# Patient Record
Sex: MALE | Race: BLACK OR AFRICAN AMERICAN | NOT HISPANIC OR LATINO | Employment: UNEMPLOYED | URBAN - METROPOLITAN AREA
[De-identification: names, ages, dates, MRNs, and addresses within clinical notes are randomized per-mention and may not be internally consistent; named-entity substitution may affect disease eponyms.]

---

## 2018-04-09 ENCOUNTER — HOSPITAL ENCOUNTER (EMERGENCY)
Facility: HOSPITAL | Age: 28
Discharge: HOME | End: 2018-04-09
Attending: EMERGENCY MEDICINE
Payer: MEDICAID

## 2018-04-09 VITALS
TEMPERATURE: 98 F | WEIGHT: 200 LBS | BODY MASS INDEX: 26.51 KG/M2 | HEART RATE: 66 BPM | HEIGHT: 73 IN | OXYGEN SATURATION: 98 % | SYSTOLIC BLOOD PRESSURE: 115 MMHG | RESPIRATION RATE: 17 BRPM | DIASTOLIC BLOOD PRESSURE: 57 MMHG

## 2018-04-09 DIAGNOSIS — S91.019A LACERATION OF ANKLE: ICD-10-CM

## 2018-04-09 DIAGNOSIS — R56.9 SEIZURE: ICD-10-CM

## 2018-04-09 DIAGNOSIS — S96.821A: ICD-10-CM

## 2018-04-09 LAB
ALBUMIN SERPL BCP-MCNC: 4.2 G/DL
ALP SERPL-CCNC: 69 U/L
ALT SERPL W/O P-5'-P-CCNC: 12 U/L
ANION GAP SERPL CALC-SCNC: 19 MMOL/L
AST SERPL-CCNC: 24 U/L
BASOPHILS # BLD AUTO: 0.02 K/UL
BASOPHILS NFR BLD: 0.2 %
BILIRUB SERPL-MCNC: 0.5 MG/DL
BUN SERPL-MCNC: 12 MG/DL
CALCIUM SERPL-MCNC: 9.6 MG/DL
CHLORIDE SERPL-SCNC: 104 MMOL/L
CK SERPL-CCNC: 558 U/L
CO2 SERPL-SCNC: 16 MMOL/L
CREAT SERPL-MCNC: 1.3 MG/DL
DIFFERENTIAL METHOD: ABNORMAL
EOSINOPHIL # BLD AUTO: 0 K/UL
EOSINOPHIL NFR BLD: 0.3 %
ERYTHROCYTE [DISTWIDTH] IN BLOOD BY AUTOMATED COUNT: 13.7 %
EST. GFR  (AFRICAN AMERICAN): >60 ML/MIN/1.73 M^2
EST. GFR  (NON AFRICAN AMERICAN): >60 ML/MIN/1.73 M^2
GLUCOSE SERPL-MCNC: 99 MG/DL
HCT VFR BLD AUTO: 50.2 %
HGB BLD-MCNC: 16.8 G/DL
IMM GRANULOCYTES # BLD AUTO: 0.11 K/UL
IMM GRANULOCYTES NFR BLD AUTO: 1 %
INR PPP: 1
LIPASE SERPL-CCNC: 23 U/L
LYMPHOCYTES # BLD AUTO: 2.2 K/UL
LYMPHOCYTES NFR BLD: 19.9 %
MCH RBC QN AUTO: 31.1 PG
MCHC RBC AUTO-ENTMCNC: 33.5 G/DL
MCV RBC AUTO: 93 FL
MONOCYTES # BLD AUTO: 0.7 K/UL
MONOCYTES NFR BLD: 6.4 %
NEUTROPHILS # BLD AUTO: 7.8 K/UL
NEUTROPHILS NFR BLD: 72.2 %
NRBC BLD-RTO: 0 /100 WBC
PLATELET # BLD AUTO: 218 K/UL
PMV BLD AUTO: 10.9 FL
POTASSIUM SERPL-SCNC: 3.4 MMOL/L
PROT SERPL-MCNC: 8.1 G/DL
PROTHROMBIN TIME: 10.7 SEC
RBC # BLD AUTO: 5.41 M/UL
SODIUM SERPL-SCNC: 139 MMOL/L
WBC # BLD AUTO: 10.83 K/UL

## 2018-04-09 PROCEDURE — 96365 THER/PROPH/DIAG IV INF INIT: CPT | Mod: 59

## 2018-04-09 PROCEDURE — 25000003 PHARM REV CODE 250: Performed by: STUDENT IN AN ORGANIZED HEALTH CARE EDUCATION/TRAINING PROGRAM

## 2018-04-09 PROCEDURE — 83690 ASSAY OF LIPASE: CPT

## 2018-04-09 PROCEDURE — 90471 IMMUNIZATION ADMIN: CPT | Performed by: EMERGENCY MEDICINE

## 2018-04-09 PROCEDURE — 29515 APPLICATION SHORT LEG SPLINT: CPT | Mod: LT

## 2018-04-09 PROCEDURE — 90715 TDAP VACCINE 7 YRS/> IM: CPT | Performed by: EMERGENCY MEDICINE

## 2018-04-09 PROCEDURE — 80053 COMPREHEN METABOLIC PANEL: CPT

## 2018-04-09 PROCEDURE — 96361 HYDRATE IV INFUSION ADD-ON: CPT | Mod: 59

## 2018-04-09 PROCEDURE — 29515 APPLICATION SHORT LEG SPLINT: CPT | Mod: 59,RT,, | Performed by: EMERGENCY MEDICINE

## 2018-04-09 PROCEDURE — 63600175 PHARM REV CODE 636 W HCPCS: Performed by: EMERGENCY MEDICINE

## 2018-04-09 PROCEDURE — 25000003 PHARM REV CODE 250: Performed by: EMERGENCY MEDICINE

## 2018-04-09 PROCEDURE — 13122 CMPLX RPR S/A/L ADDL 5 CM/>: CPT | Mod: 51,59,RT, | Performed by: EMERGENCY MEDICINE

## 2018-04-09 PROCEDURE — 85025 COMPLETE CBC W/AUTO DIFF WBC: CPT

## 2018-04-09 PROCEDURE — 13121 CMPLX RPR S/A/L 2.6-7.5 CM: CPT | Mod: 51,59,RT, | Performed by: EMERGENCY MEDICINE

## 2018-04-09 PROCEDURE — 12034 INTMD RPR S/TR/EXT 7.6-12.5: CPT | Mod: 59

## 2018-04-09 PROCEDURE — 82550 ASSAY OF CK (CPK): CPT

## 2018-04-09 PROCEDURE — 96375 TX/PRO/DX INJ NEW DRUG ADDON: CPT | Mod: 59

## 2018-04-09 PROCEDURE — 99283 EMERGENCY DEPT VISIT LOW MDM: CPT | Mod: 25,,, | Performed by: EMERGENCY MEDICINE

## 2018-04-09 PROCEDURE — 85610 PROTHROMBIN TIME: CPT

## 2018-04-09 PROCEDURE — 99285 EMERGENCY DEPT VISIT HI MDM: CPT | Mod: 25

## 2018-04-09 RX ORDER — HYDROCODONE BITARTRATE AND ACETAMINOPHEN 5; 325 MG/1; MG/1
1 TABLET ORAL EVERY 4 HOURS PRN
Qty: 15 TABLET | Refills: 0 | Status: SHIPPED | OUTPATIENT
Start: 2018-04-09

## 2018-04-09 RX ORDER — CEFAZOLIN SODIUM 1 G/3ML
1 INJECTION, POWDER, FOR SOLUTION INTRAMUSCULAR; INTRAVENOUS
Status: DISCONTINUED | OUTPATIENT
Start: 2018-04-09 | End: 2018-04-09

## 2018-04-09 RX ORDER — AMOXICILLIN AND CLAVULANATE POTASSIUM 875; 125 MG/1; MG/1
1 TABLET, FILM COATED ORAL 2 TIMES DAILY
Qty: 14 TABLET | Refills: 0 | Status: SHIPPED | OUTPATIENT
Start: 2018-04-09

## 2018-04-09 RX ORDER — HYDROCODONE BITARTRATE AND ACETAMINOPHEN 5; 325 MG/1; MG/1
1 TABLET ORAL EVERY 4 HOURS PRN
Qty: 15 TABLET | Refills: 0 | Status: SHIPPED | OUTPATIENT
Start: 2018-04-09 | End: 2018-04-09

## 2018-04-09 RX ORDER — AMOXICILLIN AND CLAVULANATE POTASSIUM 875; 125 MG/1; MG/1
1 TABLET, FILM COATED ORAL 2 TIMES DAILY
Qty: 14 TABLET | Refills: 0 | Status: SHIPPED | OUTPATIENT
Start: 2018-04-09 | End: 2018-04-09

## 2018-04-09 RX ORDER — CEFAZOLIN SODIUM 1 G/3ML
2 INJECTION, POWDER, FOR SOLUTION INTRAMUSCULAR; INTRAVENOUS
Status: COMPLETED | OUTPATIENT
Start: 2018-04-09 | End: 2018-04-09

## 2018-04-09 RX ORDER — MORPHINE SULFATE 4 MG/ML
4 INJECTION, SOLUTION INTRAMUSCULAR; INTRAVENOUS
Status: COMPLETED | OUTPATIENT
Start: 2018-04-09 | End: 2018-04-09

## 2018-04-09 RX ORDER — LIDOCAINE HYDROCHLORIDE AND EPINEPHRINE 10; 10 MG/ML; UG/ML
20 INJECTION, SOLUTION INFILTRATION; PERINEURAL ONCE
Status: COMPLETED | OUTPATIENT
Start: 2018-04-09 | End: 2018-04-09

## 2018-04-09 RX ORDER — PROCHLORPERAZINE EDISYLATE 5 MG/ML
10 INJECTION INTRAMUSCULAR; INTRAVENOUS
Status: COMPLETED | OUTPATIENT
Start: 2018-04-09 | End: 2018-04-09

## 2018-04-09 RX ORDER — LIDOCAINE HYDROCHLORIDE 10 MG/ML
20 INJECTION INFILTRATION; PERINEURAL ONCE
Status: DISCONTINUED | OUTPATIENT
Start: 2018-04-09 | End: 2018-04-09

## 2018-04-09 RX ORDER — LEVETIRACETAM 10 MG/ML
1000 INJECTION INTRAVASCULAR
Status: COMPLETED | OUTPATIENT
Start: 2018-04-09 | End: 2018-04-09

## 2018-04-09 RX ORDER — CEFTRIAXONE 1 G/1
1 INJECTION, POWDER, FOR SOLUTION INTRAMUSCULAR; INTRAVENOUS
Status: DISCONTINUED | OUTPATIENT
Start: 2018-04-09 | End: 2018-04-09

## 2018-04-09 RX ADMIN — PROCHLORPERAZINE EDISYLATE 10 MG: 5 INJECTION INTRAMUSCULAR; INTRAVENOUS at 08:04

## 2018-04-09 RX ADMIN — MORPHINE SULFATE 4 MG: 4 INJECTION INTRAVENOUS at 09:04

## 2018-04-09 RX ADMIN — CLOSTRIDIUM TETANI TOXOID ANTIGEN (FORMALDEHYDE INACTIVATED), CORYNEBACTERIUM DIPHTHERIAE TOXOID ANTIGEN (FORMALDEHYDE INACTIVATED), BORDETELLA PERTUSSIS TOXOID ANTIGEN (GLUTARALDEHYDE INACTIVATED), BORDETELLA PERTUSSIS FILAMENTOUS HEMAGGLUTININ ANTIGEN (FORMALDEHYDE INACTIVATED), BORDETELLA PERTUSSIS PERTACTIN ANTIGEN, AND BORDETELLA PERTUSSIS FIMBRIAE 2/3 ANTIGEN 0.5 ML: 5; 2; 2.5; 5; 3; 5 INJECTION, SUSPENSION INTRAMUSCULAR at 09:04

## 2018-04-09 RX ADMIN — LIDOCAINE HYDROCHLORIDE,EPINEPHRINE BITARTRATE 20 ML: 10; .01 INJECTION, SOLUTION INFILTRATION; PERINEURAL at 11:04

## 2018-04-09 RX ADMIN — SODIUM CHLORIDE 1000 ML: 0.9 INJECTION, SOLUTION INTRAVENOUS at 09:04

## 2018-04-09 RX ADMIN — LEVETIRACETAM 1000 MG: 10 INJECTION INTRAVENOUS at 09:04

## 2018-04-09 RX ADMIN — CEFAZOLIN 2 G: 330 INJECTION, POWDER, FOR SOLUTION INTRAMUSCULAR; INTRAVENOUS at 09:04

## 2018-04-09 NOTE — DISCHARGE INSTRUCTIONS
If you would like to follow up with the [facility] for further care of your tendon laceration, please call the [facility] Scheduling Department at [phone] during business hours.  Please let the  know you need a fracture follow-up appointment with Orthopedics, and you will be scheduled in the Orthopedic Clinic.  Please bring your original Emergency Department discharge papers with  you to the clinic appointment.

## 2018-04-09 NOTE — ED NOTES
LOC: The patient is awake, alert and aware of environment with an appropriate affect, the patient is oriented x 3 and speaking appropriately.  APPEARANCE: Patient resting comfortably and in no acute distress, patient is clean and well groomed, patient's clothing is properly fastened.  SKIN: The skin is warm and dry, color consistent with ethnicity, patient has normal skin turgor and moist mucus membranes, skin intact, no breakdown or bruising noted.  Approx 6 cm irregular laceration to Left medial ankle, no active bleeding present  MUSCULOSKELETAL: Patient moving all extremities spontaneously, no obvious swelling or deformities noted.  RESPIRATORY: Airway is open and patent, respirations are spontaneous, patient has a normal effort and rate, no accessory muscle use.  CARDIAC: Patient has a normal rate and regular rhythm, no periphreal edema noted, capillary refill < 3 seconds.  ABDOMEN: Soft and non tender to palpation, no distention noted, normoactive bowel sounds present in all four quadrants, tender to mid abd region.  NEUROLOGIC:  facial expression is symmetrical, patient moving all extremities spontaneously, normal sensation in all extremities when touched with a finger.  Follows all commands appropriately.

## 2018-04-09 NOTE — ED PROVIDER NOTES
Encounter Date: 4/9/2018       History     Chief Complaint   Patient presents with    Seizures     HPI     27-year-old male brought in by EMS for evaluation of seizure, nausea/vomiting, as well as laceration.  Patient reports that after eating some [employer] this morning he began having severe epigastric abdominal pain and feeling very nauseous.  Patient then had a witnessed seizure.  Patient has a known history of seizure disorder and is supposed to take Keppra as well as Dilantin but has been noncompliant with medications for the last 1 month.  Per patient's family who witnessed the seizure patient had full body shaking consistent with previous seizures.  However, patient did strike a mirror during his seizure resulting in an ankle laceration.  On EMS arrival patient was postictal but by the time he arrived to the ED patient had cleared and was mentating appropriately.  However he was having severe vomiting that was nonbilious and nonbloody.    Review of patient's allergies indicates:  No Known Allergies  Past Medical History:   Diagnosis Date    Seizure      No past surgical history on file.  No family history on file.  Social History   Substance Use Topics    Smoking status: Not on file    Smokeless tobacco: Not on file    Alcohol use Not on file     Review of Systems   Constitutional: Negative for chills and fever.   HENT: Negative for sore throat and voice change.    Eyes: Negative for pain and visual disturbance.   Respiratory: Negative for cough and shortness of breath.    Cardiovascular: Negative for chest pain and palpitations.   Gastrointestinal: Positive for abdominal pain, nausea and vomiting. Negative for abdominal distention, constipation and diarrhea.   Genitourinary: Negative for dysuria and flank pain.   Musculoskeletal: Negative for back pain and neck pain.   Skin: Positive for wound. Negative for rash.   Neurological: Positive for seizures and headaches. Negative for weakness and  light-headedness.       Physical Exam     Initial Vitals [04/09/18 0855]   BP Pulse Resp Temp SpO2   (!) 122/91 (!) 118 20 97.8 °F (36.6 °C) 99 %      MAP       101.33         Physical Exam    Nursing note and vitals reviewed.  Constitutional: He appears well-developed and well-nourished. He appears distressed.   HENT:   Head: Normocephalic and atraumatic.   Eyes: Conjunctivae are normal. Pupils are equal, round, and reactive to light. No scleral icterus.   Neck: Normal range of motion. Neck supple.   Cardiovascular: Regular rhythm, normal heart sounds and intact distal pulses. Tachycardia present.    No murmur heard.  Pulmonary/Chest: Breath sounds normal. He has no wheezes.   Abdominal: Soft. Bowel sounds are normal. He exhibits no distension. There is tenderness (epigastric). There is no rebound and no guarding.   Musculoskeletal: He exhibits no edema.        Feet:    Approximately 10 cm laceration over the left medial ankle.  Laceration begins just dorsal to the medial malleoli and extends posteriorly across the medial malleoli to the back at the foot.  Examination of the wound revealed findings concerning for likely tendon laceration.    Patient is able to dorsi and plantarflex the foot with no difficulty but is unable to dustin or invert the left foot.  Wound is hemostatic and there is a good dorsalis pedis pulse.   Neurological: He is oriented to person, place, and time. No cranial nerve deficit.   Skin: Skin is warm and dry.             ED Course   Lac Repair  Date/Time: 4/9/2018 2:32 PM  Performed by: MARYBEL DOWLING  Authorized by: CHAPITO ESPAÑA   Body area: lower extremity  Location details: left ankle  Laceration length: 10 cm  Foreign bodies: no foreign bodies  Tendon involvement: complex  Nerve involvement: none  Vascular damage: no  Anesthesia: local infiltration    Anesthesia:  Local Anesthetic: lidocaine 2% with epinephrine  Anesthetic total: 8 mL  Patient sedated: no  Irrigation solution:  saline  Irrigation method: syringe  Amount of cleaning: extensive  Debridement: none  Degree of undermining: none  Skin closure: 3-0 Prolene  Subcutaneous closure: 5-0 Vicryl  Number of sutures: 15 (3 subQ)  Technique: simple  Approximation: close  Approximation difficulty: complex  Dressing: non-stick sterile dressing and splint for protection  Patient tolerance: Patient tolerated the procedure well with no immediate complications    Splint Application  Date/Time: 4/9/2018 2:34 PM  Performed by: MARYBEL DOWLING  Authorized by: CHAPITO ESPAÑA   Location details: left ankle  Splint type: short leg  Supplies used: cotton padding,  plaster and elastic bandage  Post-procedure: The splinted body part was neurovascularly unchanged following the procedure.  Patient tolerance: Patient tolerated the procedure well with no immediate complications        Labs Reviewed   CBC W/ AUTO DIFFERENTIAL - Abnormal; Notable for the following:        Result Value    MCH 31.1 (*)     Immature Granulocytes 1.0 (*)     Gran # (ANC) 7.8 (*)     Immature Grans (Abs) 0.11 (*)     All other components within normal limits   COMPREHENSIVE METABOLIC PANEL - Abnormal; Notable for the following:     Potassium 3.4 (*)     CO2 16 (*)     Anion Gap 19 (*)     All other components within normal limits   CK - Abnormal; Notable for the following:      (*)     All other components within normal limits   LIPASE   PROTIME-INR     Imaging Results          X-Ray Ankle Complete Left (Final result)  Result time 04/09/18 10:17:13    Final result by Jorje Morris MD (04/09/18 10:17:13)                 Impression:      See above      Electronically signed by: Jorje Morris MD  Date:    04/09/2018  Time:    10:17             Narrative:    EXAMINATION:  XR ANKLE COMPLETE 3 VIEW LEFT    CLINICAL HISTORY:  Laceration without foreign body, unspecified ankle, initial encounter    TECHNIQUE:  AP, lateral and oblique views of the left ankle were  performed.    COMPARISON:  None    FINDINGS:  Soft tissue injury a and/or laceration posteriorly in the distal leg and ankle.  No fracture or dislocation.  No bone destruction identified                                    HOII MDM    DDx includes but is not limited to: Fracture, dislocation, foreign body, tendon injury, pancreatitis, gastroenteritis, rhabdomyolysis, electrolyte abnormality, glucose abnormality  A/P:  Pt is a 27-year-old male with seizures, nausea/vomiting, and laceration.  Patient's signs and symptoms are most consistent with a gastroenteritis secondary to foodborne illness .patient's lipase was normal and the vomiting was controlled with Compazine.  Patient did have a mild anion gap as well as a mildly elevated CPK consistent with the seizure that was reported.  There were no other remarkable electrolyte or glucose abnormalities.  Patient's seizure was likely secondary to his medication noncompliance.  He was loaded with Keppra in the ED today.  X-ray did not reveal any fracture or dislocation.  Orthopedics was consulted given the possible involvement of tendon injury.  Patient's final disposition is pending orthopedic recommendations as well as wound closure.     Case Discussed with Dr. Pat MEJIA  04/09/2018 11:28 AM      UPDATE  Pt has remained seizure-free as well as had no episodes of emesis.  Patient has received IV fluids as well as antibiotics.  Laceration was repaired and posterior slab splint placed per Dr. Chilel recommendations.  Dr. Chilel with orthopedics evaluated the patient and feels that he likely has a posterior tibialis tendon laceration that may require surgical repair.  He recommends skin closure and posterior slab splint at this time and patient will need to follow-up with orthopedics will discharge patient with Augmentin and pain medication as well as follow-up information for [facility]    Sujata MEJIA  04/09/2018 2:31 PM           Medical  Decision Making:   Differential Diagnosis:   Seizure disorder, electrolyte derangement, foot laceration, tendon laceration  Clinical Tests:   Lab Tests: Ordered and Reviewed  Radiological Study: Ordered and Reviewed  Other:   I have discussed this case with another health care provider.              Attending Attestation:   Physician Attestation Statement for Resident:  As the supervising MD   Physician Attestation Statement: I have personally seen and examined this patient.   I agree with the above history. -: 27-year-old man presents for evaluation of nausea, vomiting, seizure activity, as well as a laceration to his left ankle sustained earlier this morning.   As the supervising MD I agree with the above PE.    As the supervising MD I agree with the above treatment, course, plan, and disposition.  I was personally present during the critical portions of the procedure(s) performed by the resident and was immediately available in the ED to provide services and assistance as needed during the entire procedure.  I have reviewed and agree with the residents interpretation of the following: lab data, x-rays and CT scans.                       Clinical Impression:   The primary encounter diagnosis was Tendon laceration. Diagnoses of Laceration of ankle and Seizure were also pertinent to this visit.    Disposition:   Disposition: Discharged  Condition: Stable                        Sujata Starr MD  Resident  04/09/18 3017       Craig Stewart MD  04/12/18 [id number]

## 2018-04-10 DIAGNOSIS — S86.112A RUPTURE OF LEFT POSTERIOR TIBIALIS TENDON, INITIAL ENCOUNTER: Primary | ICD-10-CM

## 2018-04-10 PROBLEM — S91.012A LACERATION OF LEFT ANKLE: Status: ACTIVE | Noted: 2018-04-10

## 2018-04-10 RX ORDER — SODIUM CHLORIDE 9 MG/ML
INJECTION, SOLUTION INTRAVENOUS CONTINUOUS
Status: CANCELLED | OUTPATIENT
Start: 2018-04-10

## 2018-04-10 RX ORDER — MUPIROCIN 20 MG/G
OINTMENT TOPICAL
Status: CANCELLED | OUTPATIENT
Start: 2018-04-10

## 2018-04-10 NOTE — SUBJECTIVE & OBJECTIVE
"Past Medical History:   Diagnosis Date    Seizure        PSH: none    Review of patient's allergies indicates:  No Known Allergies    No current facility-administered medications for this encounter.      Current Outpatient Prescriptions   Medication Sig    LEVETIRACETAM (KEPPRA ORAL) Take by mouth.    PHENYTOIN SODIUM EXTENDED (DILANTIN ORAL) Take by mouth.    amoxicillin-clavulanate 875-125mg (AUGMENTIN) 875-125 mg per tablet Take 1 tablet by mouth 2 (two) times daily.    hydrocodone-acetaminophen 5-325mg (NORCO) 5-325 mg per tablet Take 1 tablet by mouth every 4 (four) hours as needed for Pain.     Family History     None        Social History Main Topics    Smoking status: Not on file    Smokeless tobacco: Not on file    Alcohol use Not on file    Drug use: Unknown    Sexual activity: Not on file     ROS   Constitutional: negative for fevers  Eyes: no visual changes  ENT: negative for hearing loss  Respiratory: negative for dyspnea  Cardiovascular: negative for chest pain  Gastrointestinal: negative for abdominal pain  Genitourinary: negative for dysuria  Neurological: negative for headaches  Behavioral/Psych: negative for hallucinations  Endocrine: negative for temperature intolerance    Objective:     Vital Signs (Most Recent):  Temp: 97.8 °F (36.6 °C) (04/09/18 0855)  Pulse: 66 (04/09/18 1403)  Resp: 17 (04/09/18 1403)  BP: (!) 115/57 (04/09/18 1402)  SpO2: 98 % (04/09/18 1403) Vital Signs (24h Range):  Pulse:  [65-66] 66  Resp:  [17] 17  SpO2:  [97 %-98 %] 98 %  BP: (115)/(57) 115/57     Weight: 90.7 kg (200 lb)  Height: 6' 1" (185.4 cm)  Body mass index is 26.39 kg/m².        Ortho/SPM Exam  Vitals: Afebrile.  Vital signs stable.  General: No acute distress.  HEENT: Normocephalic. Atraumatic. Sclera anicteric. No tracheal deviation.  Cardio: Regular rate and rhythm.  Chest: No increased work of breathing.  Abdominal: Nondistended.  Extremities: No cyanosis.  No clubbing.  No edema.  Palpable " pulses.  Skin: No generalized rash.  Neuro: Awake. Alert. Oriented to person, place, time, and situation.  Psych: Normal appearance. Cooperative.  Appropriate mood.  Appropriate affect.      MSK:  LLE:   ~10cm transverse laceration 3cm proximal to tip of medial malleolus involving muscle/bone extending from achilles tendon anteriorly  No deformity  No obvious ecchymoses  Compartments soft and compressible  No pain with ankle ROM  Achilles, FHL, FDL intact  Tendon retraction seen posterior to tibia during active ankle plantarflexion  SILT T/SP/DP/Stovall/Sa  Motor intact T/SP/DP  Brisk cap refill  Warm well perfused extremities  DP/PT palpable and equal to contralateral    Significant Labs:   BMP:   Recent Labs  Lab 04/09/18  0901   GLU 99      K 3.4*      CO2 16*   BUN 12   CREATININE 1.3   CALCIUM 9.6     CBC:   Recent Labs  Lab 04/09/18  0901   WBC 10.83   HGB 16.8   HCT 50.2        All pertinent labs within the past 24 hours have been reviewed.    Significant Imaging: I have reviewed all pertinent imaging results/findings.     XR L ankle negative for fracture or dislocation.

## 2018-04-10 NOTE — HPI
27M with h/o epilepsy on Keppra and Dilantin presents to ED with L ankle laceration after likely seizure.    Seizure was witnessed.  Witness reports episode similar to prior seizures.  L ankle struck and broke mirror leading to laceration.  Preceeded by nausea, abdominal pain.  Patient has been noncompliant with medications past month.  Postictal on EMS arrival.  Mentating appropriately in ED with n/v.  Denies injuries to head/neck/other extremies, numbness, tingling.  Ancef given.  Tetanus updated.

## 2018-04-10 NOTE — ASSESSMENT & PLAN NOTE
27 y.o. male with L ankle laceration and likely tibialis posterior laceration    Laceration closed and splinted by ED staff  Pain control  Abx: Ancef given in ED; tetanus updated    Recommend exploration with primary repair vs transfer.  Surgery scheduled 4/18/18 with clinic f/u prior.

## 2018-04-10 NOTE — CONSULTS
[facility]  Orthopedics  Consult Note    Patient Name: Michael uS  MRN: [id number]  Admission Date: 4/9/2018  Hospital Length of Stay: 0 days  Primary Care Provider: No primary care provider on file.    Patient information was obtained from patient, past medical records and ER records.     Consults  Subjective:       Chief Complaint:   Chief Complaint   Patient presents with    Seizures        HPI: 27M with h/o epilepsy on Keppra and Dilantin presents to ED with L ankle laceration after likely seizure.    Seizure was witnessed.  Witness reports episode similar to prior seizures.  L ankle struck and broke mirror leading to laceration.  Preceeded by nausea, abdominal pain.  Patient has been noncompliant with medications past month.  Postictal on EMS arrival.  Mentating appropriately in ED with n/v.  Denies injuries to head/neck/other extremies, numbness, tingling.  Ancef given.  Tetanus updated.    Past Medical History:   Diagnosis Date    Seizure        PSH: none    Review of patient's allergies indicates:  No Known Allergies    No current facility-administered medications for this encounter.      Current Outpatient Prescriptions   Medication Sig    LEVETIRACETAM (KEPPRA ORAL) Take by mouth.    PHENYTOIN SODIUM EXTENDED (DILANTIN ORAL) Take by mouth.    amoxicillin-clavulanate 875-125mg (AUGMENTIN) 875-125 mg per tablet Take 1 tablet by mouth 2 (two) times daily.    hydrocodone-acetaminophen 5-325mg (NORCO) 5-325 mg per tablet Take 1 tablet by mouth every 4 (four) hours as needed for Pain.     Family History     None        Social History Main Topics    Smoking status: Not on file    Smokeless tobacco: Not on file    Alcohol use Not on file    Drug use: Unknown    Sexual activity: Not on file     ROS   Constitutional: negative for fevers  Eyes: no visual changes  ENT: negative for hearing loss  Respiratory: negative for dyspnea  Cardiovascular: negative for chest  "pain  Gastrointestinal: negative for abdominal pain  Genitourinary: negative for dysuria  Neurological: negative for headaches  Behavioral/Psych: negative for hallucinations  Endocrine: negative for temperature intolerance    Objective:     Vital Signs (Most Recent):  Temp: 97.8 °F (36.6 °C) (04/09/18 0855)  Pulse: 66 (04/09/18 1403)  Resp: 17 (04/09/18 1403)  BP: (!) 115/57 (04/09/18 1402)  SpO2: 98 % (04/09/18 1403) Vital Signs (24h Range):  Pulse:  [65-66] 66  Resp:  [17] 17  SpO2:  [97 %-98 %] 98 %  BP: (115)/(57) 115/57     Weight: 90.7 kg (200 lb)  Height: 6' 1" (185.4 cm)  Body mass index is 26.39 kg/m².        Ortho/SPM Exam  Vitals: Afebrile.  Vital signs stable.  General: No acute distress.  HEENT: Normocephalic. Atraumatic. Sclera anicteric. No tracheal deviation.  Cardio: Regular rate and rhythm.  Chest: No increased work of breathing.  Abdominal: Nondistended.  Extremities: No cyanosis.  No clubbing.  No edema.  Palpable pulses.  Skin: No generalized rash.  Neuro: Awake. Alert. Oriented to person, place, time, and situation.  Psych: Normal appearance. Cooperative.  Appropriate mood.  Appropriate affect.      MSK:  LLE:   ~10cm transverse laceration 3cm proximal to tip of medial malleolus involving muscle/bone extending from achilles tendon anteriorly  No deformity  No obvious ecchymoses  Compartments soft and compressible  No pain with ankle ROM  Achilles, FHL, FDL intact  Tendon retraction seen posterior to tibia during active ankle plantarflexion  SILT T/SP/DP/Stovall/Sa  Motor intact T/SP/DP  Brisk cap refill  Warm well perfused extremities  DP/PT palpable and equal to contralateral    Significant Labs:   BMP:   Recent Labs  Lab 04/09/18  0901   GLU 99      K 3.4*      CO2 16*   BUN 12   CREATININE 1.3   CALCIUM 9.6     CBC:   Recent Labs  Lab 04/09/18 0901   WBC 10.83   HGB 16.8   HCT 50.2        All pertinent labs within the past 24 hours have been reviewed.    Significant Imaging: I " have reviewed all pertinent imaging results/findings.     XR L ankle negative for fracture or dislocation.    Assessment/Plan:     Laceration of left ankle    27 y.o. male with L ankle laceration and likely tibialis posterior laceration    Laceration closed and splinted by ED staff  Pain control  Abx: Ancef given in ED; tetanus updated    Recommend exploration with primary repair vs transfer.  Surgery scheduled 4/18/18 with clinic f/u prior.            Thank you for your consult.     Mitch Chilel MD  Orthopedics  [facility]

## 2018-04-11 ENCOUNTER — PES CALL (OUTPATIENT)
Dept: ADMINISTRATIVE | Facility: CLINIC | Age: 28
End: 2018-04-11

## 2018-04-13 ENCOUNTER — TELEPHONE (OUTPATIENT)
Dept: ORTHOPEDICS | Facility: CLINIC | Age: 28
End: 2018-04-13

## 2018-04-13 NOTE — TELEPHONE ENCOUNTER
Spoke with pt notified him he had an appt this morning pt states his mother in law will bring him between [phone]

## 2018-04-14 ENCOUNTER — ANESTHESIA EVENT (OUTPATIENT)
Dept: SURGERY | Facility: HOSPITAL | Age: 28
End: 2018-04-14

## 2018-04-14 ENCOUNTER — HOSPITAL ENCOUNTER (EMERGENCY)
Facility: HOSPITAL | Age: 28
Discharge: LEFT BEFORE DISCHARGE | End: 2018-04-14
Attending: EMERGENCY MEDICINE
Payer: MEDICAID

## 2018-04-14 VITALS
RESPIRATION RATE: 18 BRPM | SYSTOLIC BLOOD PRESSURE: 138 MMHG | TEMPERATURE: 98 F | DIASTOLIC BLOOD PRESSURE: 79 MMHG | OXYGEN SATURATION: 100 % | BODY MASS INDEX: 28.88 KG/M2 | WEIGHT: 225 LBS | HEART RATE: 95 BPM | HEIGHT: 74 IN

## 2018-04-14 DIAGNOSIS — S91.012A: ICD-10-CM

## 2018-04-14 DIAGNOSIS — S96.922A: ICD-10-CM

## 2018-04-14 LAB
ALBUMIN SERPL BCP-MCNC: 4.1 G/DL
ALP SERPL-CCNC: 68 U/L
ALT SERPL W/O P-5'-P-CCNC: 16 U/L
ANION GAP SERPL CALC-SCNC: 8 MMOL/L
AST SERPL-CCNC: 26 U/L
BASOPHILS # BLD AUTO: 0.03 K/UL
BASOPHILS NFR BLD: 0.4 %
BILIRUB SERPL-MCNC: 0.6 MG/DL
BUN SERPL-MCNC: 8 MG/DL
CALCIUM SERPL-MCNC: 10.2 MG/DL
CHLORIDE SERPL-SCNC: 104 MMOL/L
CO2 SERPL-SCNC: 29 MMOL/L
CREAT SERPL-MCNC: 1 MG/DL
DIFFERENTIAL METHOD: ABNORMAL
EOSINOPHIL # BLD AUTO: 0.2 K/UL
EOSINOPHIL NFR BLD: 2.3 %
ERYTHROCYTE [DISTWIDTH] IN BLOOD BY AUTOMATED COUNT: 13.4 %
EST. GFR  (AFRICAN AMERICAN): >60 ML/MIN/1.73 M^2
EST. GFR  (NON AFRICAN AMERICAN): >60 ML/MIN/1.73 M^2
GLUCOSE SERPL-MCNC: 95 MG/DL
HCT VFR BLD AUTO: 44.7 %
HGB BLD-MCNC: 15.2 G/DL
IMM GRANULOCYTES # BLD AUTO: 0.02 K/UL
IMM GRANULOCYTES NFR BLD AUTO: 0.3 %
LYMPHOCYTES # BLD AUTO: 2 K/UL
LYMPHOCYTES NFR BLD: 29 %
MCH RBC QN AUTO: 31.2 PG
MCHC RBC AUTO-ENTMCNC: 34 G/DL
MCV RBC AUTO: 92 FL
MONOCYTES # BLD AUTO: 0.6 K/UL
MONOCYTES NFR BLD: 9 %
NEUTROPHILS # BLD AUTO: 4.1 K/UL
NEUTROPHILS NFR BLD: 59 %
NRBC BLD-RTO: 0 /100 WBC
PLATELET # BLD AUTO: 216 K/UL
PMV BLD AUTO: 11.1 FL
POTASSIUM SERPL-SCNC: 3.8 MMOL/L
PROT SERPL-MCNC: 8.2 G/DL
RBC # BLD AUTO: 4.87 M/UL
SODIUM SERPL-SCNC: 141 MMOL/L
WBC # BLD AUTO: 6.99 K/UL

## 2018-04-14 PROCEDURE — 99283 EMERGENCY DEPT VISIT LOW MDM: CPT

## 2018-04-14 PROCEDURE — 80053 COMPREHEN METABOLIC PANEL: CPT

## 2018-04-14 PROCEDURE — 99283 EMERGENCY DEPT VISIT LOW MDM: CPT | Mod: ,,, | Performed by: PHYSICIAN ASSISTANT

## 2018-04-14 PROCEDURE — 85025 COMPLETE CBC W/AUTO DIFF WBC: CPT

## 2018-04-14 NOTE — ED NOTES
Pt reports ankle infected cut it last Monday and suppose to have surgery next Monday. Sutures noted to site+ redness and sweling noted to site, pt reports cloudy drainage Wednesday and drainage stopped yesterday. Finished atbx early, took too often. Also c/o tooth broke last night.     LOC: The patient is awake, alert and aware of environment with an appropriate affect, the patient is oriented x 3 and speaking appropriately.  APPEARANCE: Patient resting comfortably and in no acute distress, patient is clean and well groomed, patient's clothing is properly fastened.  SKIN: The skin is warm and dry, intact, patient has normal skin turgor and moist mucus membranes.   RESPIRATORY: Airway is open and patent, respirations are spontaneous, patient has a normal effort and rate.  CARDIAC: Patient has a normal rate and rhythm, no periphreal edema noted, capillary refill < 3 seconds. Bilateral radial pulses +2  ABDOMEN: Soft and non tender to palpation, no distention noted. Bowel sounds present  NEUROLOGIC: PERRL, facial expression is symmetrical, patient moving all extremities spontaneously, normal sensation in all extremities when touched with a finger. Follows all commands appropriately  MUSCULOSKELETAL: No obvious deformities. Full ROM in all 4 extremities. Sutures in lateral left ankle noted with redness and swelling

## 2018-04-14 NOTE — ED NOTES
Pt left [facility] w/o telling nurse or tech where he was going. Looked for pt in waiting room and outside, unable to find pt. Called pt on number listed on chart. Pt stated he was in the car eating with his mother. Pt instructed to return to [facility] to have his foot dressed. SHANIA Voss notified.

## 2018-04-14 NOTE — ED NOTES
SHANIA Voss attempted to call pt again with no answer. Will elope pt per her orders. Pt left with IV in place.

## 2018-04-14 NOTE — ED PROVIDER NOTES
"Encounter Date: 4/14/2018       History     Chief Complaint   Patient presents with    Wound Infection     L ankle sutures, red and swollen, ran out of antibiotics and pain pills    Dental Pain     tooth broke  last night'     26 yo M with hx of seizures presents to the ED for a wound check. Pt seen in this ED 5 days ago after he had a seizure and sustained a laceration of the L ankle with concerns for tendon laceration.  Pt presented today because he ran out of pain medication and his antibiotics. Pt was given a 7-day course of Augmentin, which he completed today (day 6).  Pt reports cloudy drainage from the wound 2 days ago as well as chills and weakness this morning.           Review of patient's allergies indicates:  No Known Allergies  Past Medical History:   Diagnosis Date    Seizure      History reviewed. No pertinent surgical history.  History reviewed. No pertinent family history.  Social History   Substance Use Topics    Smoking status: Never Smoker    Smokeless tobacco: Never Used    Alcohol use Yes      Comment: occasional      Review of Systems   Constitutional: Positive for chills. Negative for fever.   HENT: Negative for sore throat.    Respiratory: Negative for shortness of breath.    Cardiovascular: Negative for chest pain.   Gastrointestinal: Negative for nausea.   Genitourinary: Negative for dysuria.   Musculoskeletal: Negative for back pain.   Skin: Positive for wound. Negative for rash.   Neurological: Positive for weakness.   Hematological: Does not bruise/bleed easily.       Physical Exam     Initial Vitals [04/14/18 0936]   BP Pulse Resp Temp SpO2   136/80 81 16 98.3 °F (36.8 °C) 98 %      MAP       98.67         Physical Exam    Nursing note and vitals reviewed.  Constitutional: He appears well-developed and well-nourished.  Non-toxic appearance. He does not appear ill. No distress.   Pt is diaphoretic - states he "sweats all the time". Otherwise nontoxic appearing.    HENT:   Head: " Normocephalic and atraumatic.   Neck: Normal range of motion. Neck supple.   Cardiovascular: Normal rate and regular rhythm. Exam reveals no gallop, no distant heart sounds and no friction rub.    No murmur heard.  Pulmonary/Chest: Effort normal and breath sounds normal. No accessory muscle usage. No tachypnea. No respiratory distress. He has no decreased breath sounds. He has no wheezes. He has no rhonchi. He has no rales.   Abdominal: He exhibits no distension.   Musculoskeletal: Normal range of motion.   Swelling erythema and warmth with tenderness to the medial ankle. Large laceration with multiple sutures in place. DP pulse intact.    Neurological: He is alert.   Skin: Skin is warm and dry. No rash noted. No pallor.   Psychiatric: He has a normal mood and affect. His behavior is normal.         ED Course   Procedures  Labs Reviewed   CBC W/ AUTO DIFFERENTIAL - Abnormal; Notable for the following:        Result Value    MCH 31.2 (*)     All other components within normal limits   COMPREHENSIVE METABOLIC PANEL             Medical Decision Making:   History:   Old Medical Records: I decided to obtain old medical records.  Differential Diagnosis:   My differential diagnosis includes but is not limited to:  Cellulitis, abscess, lymphangitis   Clinical Tests:   Lab Tests: Ordered and Reviewed       APC / Resident Notes:   28 yo M presents with to the ED for reevaluation of wound that was sutured at this ED 5 days ago. There is erythema and warmth surrounding the laceration. No active purulent drainage. DP pulse intact.    CBC without leukocytosis. 4 sutures removed to allow for wound drainage. The wound was not dehisced.     Pt eloped prior to redressing and resplinting of wound.  Patient was contacted by phone initially and advised to return to the ER.  Multiple attempts to contact patient were unsuccessful when he did not return to the ED.     I have reviewed the patient's records and discussed this case with my  supervising physician.                   Clinical Impression:   The encounter diagnosis was Tendon laceration.    Disposition:   Disposition: Eloped  Condition: Stable                        Shanika You PA-C  04/14/18 7648

## 2018-04-17 ENCOUNTER — TELEPHONE (OUTPATIENT)
Dept: ORTHOPEDICS | Facility: CLINIC | Age: 28
End: 2018-04-17

## 2018-04-17 NOTE — TELEPHONE ENCOUNTER
Spoke with pt.   He was unable to come to clinic for his pre op appointment.  Pt rescheduled several times.  No showed for all appointments.  Pt will have his mom call me to schedule an appointment.

## 2018-04-18 ENCOUNTER — ANESTHESIA (OUTPATIENT)
Dept: SURGERY | Facility: HOSPITAL | Age: 28
End: 2018-04-18

## 2018-07-10 ENCOUNTER — HOSPITAL ENCOUNTER (EMERGENCY)
Facility: OTHER | Age: 28
Discharge: HOME | End: 2018-07-10
Attending: EMERGENCY MEDICINE
Payer: MEDICAID

## 2018-07-10 VITALS
HEART RATE: 62 BPM | OXYGEN SATURATION: 100 % | HEIGHT: 74 IN | RESPIRATION RATE: 17 BRPM | TEMPERATURE: 98 F | BODY MASS INDEX: 28.88 KG/M2 | SYSTOLIC BLOOD PRESSURE: 157 MMHG | DIASTOLIC BLOOD PRESSURE: 86 MMHG | WEIGHT: 225 LBS

## 2018-07-10 DIAGNOSIS — R56.9 SEIZURE: Primary | ICD-10-CM

## 2018-07-10 DIAGNOSIS — M25.579 ANKLE PAIN: ICD-10-CM

## 2018-07-10 LAB
ALBUMIN SERPL BCP-MCNC: 4.3 G/DL
ALP SERPL-CCNC: 55 U/L
ALT SERPL W/O P-5'-P-CCNC: 11 U/L
ANION GAP SERPL CALC-SCNC: 8 MMOL/L
AST SERPL-CCNC: 19 U/L
BASOPHILS # BLD AUTO: 0.01 K/UL
BASOPHILS NFR BLD: 0.2 %
BILIRUB SERPL-MCNC: 0.6 MG/DL
BUN SERPL-MCNC: 14 MG/DL
CALCIUM SERPL-MCNC: 9.7 MG/DL
CHLORIDE SERPL-SCNC: 107 MMOL/L
CO2 SERPL-SCNC: 26 MMOL/L
CREAT SERPL-MCNC: 1.2 MG/DL
DIFFERENTIAL METHOD: ABNORMAL
EOSINOPHIL # BLD AUTO: 0.1 K/UL
EOSINOPHIL NFR BLD: 1.9 %
ERYTHROCYTE [DISTWIDTH] IN BLOOD BY AUTOMATED COUNT: 13.8 %
EST. GFR  (AFRICAN AMERICAN): >60 ML/MIN/1.73 M^2
EST. GFR  (NON AFRICAN AMERICAN): >60 ML/MIN/1.73 M^2
GLUCOSE SERPL-MCNC: 84 MG/DL
HCT VFR BLD AUTO: 45.4 %
HGB BLD-MCNC: 15.9 G/DL
LYMPHOCYTES # BLD AUTO: 1.6 K/UL
LYMPHOCYTES NFR BLD: 30.3 %
MCH RBC QN AUTO: 31.8 PG
MCHC RBC AUTO-ENTMCNC: 35 G/DL
MCV RBC AUTO: 91 FL
MONOCYTES # BLD AUTO: 0.3 K/UL
MONOCYTES NFR BLD: 6.2 %
NEUTROPHILS # BLD AUTO: 3.3 K/UL
NEUTROPHILS NFR BLD: 61 %
PHENYTOIN SERPL-MCNC: <0.1 UG/ML
PLATELET # BLD AUTO: 195 K/UL
PMV BLD AUTO: 10.8 FL
POTASSIUM SERPL-SCNC: 4.8 MMOL/L
PROT SERPL-MCNC: 7.6 G/DL
RBC # BLD AUTO: 5 M/UL
SODIUM SERPL-SCNC: 141 MMOL/L
WBC # BLD AUTO: 5.35 K/UL

## 2018-07-10 PROCEDURE — 96361 HYDRATE IV INFUSION ADD-ON: CPT

## 2018-07-10 PROCEDURE — 85025 COMPLETE CBC W/AUTO DIFF WBC: CPT

## 2018-07-10 PROCEDURE — 63600175 PHARM REV CODE 636 W HCPCS: Mod: JG | Performed by: EMERGENCY MEDICINE

## 2018-07-10 PROCEDURE — 80185 ASSAY OF PHENYTOIN TOTAL: CPT

## 2018-07-10 PROCEDURE — 99284 EMERGENCY DEPT VISIT MOD MDM: CPT | Mod: 25

## 2018-07-10 PROCEDURE — 93005 ELECTROCARDIOGRAM TRACING: CPT

## 2018-07-10 PROCEDURE — 80053 COMPREHEN METABOLIC PANEL: CPT

## 2018-07-10 PROCEDURE — 93010 ELECTROCARDIOGRAM REPORT: CPT | Mod: ,,, | Performed by: INTERNAL MEDICINE

## 2018-07-10 PROCEDURE — 96365 THER/PROPH/DIAG IV INF INIT: CPT

## 2018-07-10 PROCEDURE — 25000003 PHARM REV CODE 250: Performed by: EMERGENCY MEDICINE

## 2018-07-10 RX ORDER — PHENYTOIN SODIUM 100 MG/1
100 CAPSULE, EXTENDED RELEASE ORAL 3 TIMES DAILY
Qty: 90 CAPSULE | Refills: 0 | Status: SHIPPED | OUTPATIENT
Start: 2018-07-10 | End: 2018-08-09

## 2018-07-10 RX ORDER — IBUPROFEN 400 MG/1
800 TABLET ORAL
Status: COMPLETED | OUTPATIENT
Start: 2018-07-10 | End: 2018-07-10

## 2018-07-10 RX ADMIN — SODIUM CHLORIDE 1000 ML: 0.9 INJECTION, SOLUTION INTRAVENOUS at 09:07

## 2018-07-10 RX ADMIN — DEXTROSE 1020 MG PE: 50 INJECTION, SOLUTION INTRAVENOUS at 12:07

## 2018-07-10 RX ADMIN — IBUPROFEN 800 MG: 400 TABLET, FILM COATED ORAL at 01:07

## 2018-07-10 NOTE — DISCHARGE INSTRUCTIONS
We have prescribed you seizure medication. Please fill and take as directed.    Please return to the ER if you have chest pain, difficulty breathing, fevers, altered mental status, dizziness, weakness, or any other concerns.      Follow up with your primary care physician.      Do not drive until you are cleared by your physician.

## 2018-07-10 NOTE — ED TRIAGE NOTES
Pt reports to ED via [facility] after witnessed seizure this morning. Seizure witnessed by wife while pt was in bed, lasted approx 1 min. Pt reports noncompliant with keppra and dilantin x months, has not taken seizure meds after getting out of snf. Pt reports left ankle pain, scar with swelling noted to left ankle, pt reports lac with repair post seizure in April 2018. Pt does not remember events from this morning. Per EMS when they arrived to scene pt was ambulatory, post ictal and lethargic but AAO x 3. No urinary or stool incontinence noted.

## 2018-07-10 NOTE — ED NOTES
Patient is resting comfortably in stretcher, respirations are even and unlabored. Patient skin is warm and dry. Patient in no acute distress. The patient has been updated on plan of care and current status. . Comfort postioning and restroom needs were addressed. Pt personal items placed at bedside. The call bell remains at bedside, bed in lowest position and side up x 2. Will continue to monitor patient.

## 2018-07-10 NOTE — ED NOTES
No change in pt status. Pt sleeping in stretcher in NAD with even, unlabored respirations. Bed in lowest, locked position, side rails up x 2. Call light within reach.

## 2018-07-10 NOTE — ED PROVIDER NOTES
"Encounter Date: 7/10/2018    SCRIBE #1 NOTE: I, Carlos Mauricio, am scribing for, and in the presence of, Dr. Gonzalez .       History     Chief Complaint   Patient presents with    Seizures     witnessed seizure lasted approx 1 min, witnessed by wife. Pt out of meds (keppra, dilantin) x "couples week". When EMS arrived pt post ictal, ambulatory in NAD.      Time seen by provider: 9:10 AM    This is a 27 y.o. male, with history of seizures, who presents via EMS s/p seizure that occurred approximately one hour ago. Per EMS, patient was in bed during onset, it was witnessed by his wife, and the episode lasted for approximately one minute. Patient states he was recently released from long-term, and he hasn't been compliant with Dilantin or Keppra since his release. He notes his last seizure occurred three months ago. He is also endorsing worsening, chronic, left, ankle pain. He denies use of alcohol, tobacco, or illicit drugs.         The history is provided by the patient and the EMS personnel.     Review of patient's allergies indicates:  No Known Allergies  Past Medical History:   Diagnosis Date    Seizure      History reviewed. No pertinent surgical history.  History reviewed. No pertinent family history.  Social History   Substance Use Topics    Smoking status: Never Smoker    Smokeless tobacco: Never Used    Alcohol use Yes      Comment: occasional      Review of Systems   Constitutional: Negative for chills and fever.   HENT: Negative for congestion, rhinorrhea and sore throat.    Respiratory: Negative for cough and shortness of breath.    Cardiovascular: Negative for chest pain.   Gastrointestinal: Negative for abdominal pain, diarrhea, nausea and vomiting.   Genitourinary: Negative for decreased urine volume, difficulty urinating, dysuria and frequency.   Musculoskeletal: Negative for back pain.        Positive for left ankle pain (chronic).   Skin: Negative for rash.   Neurological: Positive for seizures. Negative " for dizziness, weakness and headaches.   Psychiatric/Behavioral: Negative for confusion.       Physical Exam     Initial Vitals [07/10/18 0855]   BP Pulse Resp Temp SpO2   119/78 90 17 97.6 °F (36.4 °C) 97 %      MAP       --         Physical Exam    Nursing note and vitals reviewed.  Constitutional: He appears well-developed and well-nourished. No distress.   Groggy but arousable on exam.    HENT:   Head: Normocephalic.   Right tongue laceration present.   Eyes: Conjunctivae and EOM are normal. Pupils are equal, round, and reactive to light.   Pupils 3mm and reactive bilaterally   Neck: Normal range of motion. Neck supple.   Cardiovascular: Normal rate, regular rhythm and normal heart sounds.   Pulmonary/Chest: Breath sounds normal. No respiratory distress. He has no wheezes. He has no rales.   Abdominal: Soft. Bowel sounds are normal. He exhibits no distension. There is no tenderness. There is no rebound.   Musculoskeletal: Normal range of motion. He exhibits edema. He exhibits no tenderness.   Left ankle diffusely swollen.    Neurological: He is alert and oriented to person, place, and time.   Skin: Skin is warm and dry.   Psychiatric: He has a normal mood and affect. His behavior is normal. Judgment and thought content normal.         ED Course   Procedures  Labs Reviewed   CBC W/ AUTO DIFFERENTIAL - Abnormal; Notable for the following:        Result Value    MCH 31.8 (*)     All other components within normal limits   PHENYTOIN LEVEL, TOTAL - Abnormal; Notable for the following:     Phenytoin Lvl <0.1 (*)     All other components within normal limits    Narrative:     Recoll. [id number] by [facility] at 07/10/2018 10:11, reason: grossly   hemolyzed notified shahzad in er   COMPREHENSIVE METABOLIC PANEL    Narrative:     Recoll. [id number] by [facility] at 07/10/2018 10:11, reason: grossly   hemolyzed notified shahzad in er     EKG Readings: (Independently Interpreted)   Initial Reading: No STEMI.   9:08 NSR at a rate of  84. Normal axis. Normal intervals. No ST or ischemic changes.        Imaging Results          X-Ray Ankle Complete Left (Final result)  Result time 07/10/18 09:58:16    Final result by Romel Koroma Jr., MD (07/10/18 09:58:16)                 Impression:      There is some heterotopic new bone and soft tissue swelling about the medial and posterior aspect of the ankle.  This may be related to prior infection or trauma though correlation with the physical findings and additional evaluation if indicated.      Electronically signed by: Romel Koroma MD  Date:    07/10/2018  Time:    09:58             Narrative:    EXAMINATION:  XR ANKLE COMPLETE 3 VIEW LEFT    CLINICAL HISTORY:  Pain in unspecified ankle and joints of unspecified foot    TECHNIQUE:  AP, lateral and oblique views of the left ankle were performed.    COMPARISON:  April 8, 2018.    FINDINGS:  There are some soft tissue calcifications abutting the medial distal tibial metaphysis.  Some associated soft tissue swelling noted.  No acute fracture seen.  Significant soft tissue swelling posteriorly about the ankle as well.                              X-Rays:   Independently Interpreted Readings:   Other Readings:  Left ankle: Soft tissue swelling present. No fractures or dislocations.     Medical Decision Making:   History:   Old Medical Records: I decided to obtain old medical records.  Old Records Summarized: other records and records from previous admission(s).  Initial Assessment:   9:10AM:  Pt is a 26 y/o M who presents to ED s/p seizure. Pt admits to noncompliance with medication. He is neurologically intact. He is groggy but arousable.  Will plan for labs, will continue to follow and reassess.    Independently Interpreted Test(s):   I have ordered and independently interpreted EKG Reading(s) - see prior notes  Clinical Tests:   Lab Tests: Ordered and Reviewed  Radiological Study: Ordered and Reviewed  Medical Tests: Ordered and Reviewed    1:37 PM: Pt  doing well. He has remained neurologically intact. His Dilantin level was undetectable.  I have given him an IV dose of fosphenytoin.  He does not know what dose of seizure medication he is supposed to be taking.  Therefore we will plan to prescribe him  Dilantin and restart his medication.  I did explain to him that he needed to establish care with a neurologist and primary care physician given his seizure history.  I updated the patient regarding the results and I counseled patient regarding supportive care measures.  I have discussed with the pt ED return warnings and need for close PCP f/u.  Pt agreeable to plan and all questions answered.  I feel that pt is stable for discharge and management as an outpatient and no further intervention is needed at this time.  Pt is comfortable returning to the ED if needed.  Will DC home in stable condition.                Scribe Attestation:   Scribe #1: I performed the above scribed service and the documentation accurately describes the services I performed. I attest to the accuracy of the note.    Attending Attestation:           Physician Attestation for Scribe:  Physician Attestation Statement for Scribe #1: I, Dr. Gonzalez, reviewed documentation, as scribed by Carlos Mauricio  in my presence, and it is both accurate and complete.                    Clinical Impression:     1. Seizure    2. Ankle pain                                   Kasia Gonzalez MD  07/10/18 9113

## 2018-08-18 ENCOUNTER — HOSPITAL ENCOUNTER (EMERGENCY)
Facility: HOSPITAL | Age: 28
Discharge: HOME | End: 2018-08-18
Attending: EMERGENCY MEDICINE
Payer: MEDICAID

## 2018-08-18 VITALS
WEIGHT: 225.06 LBS | HEIGHT: 74 IN | TEMPERATURE: 98 F | HEART RATE: 65 BPM | BODY MASS INDEX: 28.88 KG/M2 | DIASTOLIC BLOOD PRESSURE: 75 MMHG | OXYGEN SATURATION: 100 % | RESPIRATION RATE: 17 BRPM | SYSTOLIC BLOOD PRESSURE: 127 MMHG

## 2018-08-18 DIAGNOSIS — R56.9 SEIZURE: Primary | ICD-10-CM

## 2018-08-18 LAB
BUN SERPL-MCNC: 12 MG/DL (ref 6–30)
CHLORIDE SERPL-SCNC: 109 MMOL/L (ref 95–110)
CREAT SERPL-MCNC: 1.3 MG/DL (ref 0.5–1.4)
GLUCOSE SERPL-MCNC: 89 MG/DL (ref 70–110)
HCT VFR BLD CALC: 41 %PCV (ref 36–54)
POC IONIZED CALCIUM: 1.09 MMOL/L (ref 1.06–1.42)
POC TCO2 (MEASURED): 18 MMOL/L (ref 23–29)
POTASSIUM BLD-SCNC: 4 MMOL/L (ref 3.5–5.1)
SAMPLE: ABNORMAL
SODIUM BLD-SCNC: 141 MMOL/L (ref 136–145)

## 2018-08-18 PROCEDURE — 99284 EMERGENCY DEPT VISIT MOD MDM: CPT

## 2018-08-18 PROCEDURE — 99283 EMERGENCY DEPT VISIT LOW MDM: CPT | Mod: ,,, | Performed by: EMERGENCY MEDICINE

## 2018-08-18 PROCEDURE — 25000003 PHARM REV CODE 250: Performed by: EMERGENCY MEDICINE

## 2018-08-18 RX ORDER — CLONIDINE HYDROCHLORIDE 0.2 MG/1
0.2 TABLET ORAL 2 TIMES DAILY
COMMUNITY

## 2018-08-18 RX ORDER — ACETAMINOPHEN 325 MG/1
650 TABLET ORAL
Status: COMPLETED | OUTPATIENT
Start: 2018-08-18 | End: 2018-08-18

## 2018-08-18 RX ADMIN — ACETAMINOPHEN 650 MG: 325 TABLET, FILM COATED ORAL at 05:08

## 2018-08-18 NOTE — ED PROVIDER NOTES
Encounter Date: 8/18/2018    SCRIBE #1 NOTE: I, Callie Nicholson, am scribing for, and in the presence of,  Dr. Gilliland. I have scribed the entire note.       History     Chief Complaint   Patient presents with    Seizures     reports having seizure in bed tonight, hx of seizures, reports compliance with home meds, pt AAOx4      Time patient was seen by the provider: 5:12 AM      The patient is a 28 y.o. male with co-morbidities including epilepsy who presents to the ED with a complaint of having a seizure in bed tonight. He has a history of seizures and reports home medication compliance. Also had one yesterday, but it was not too bad. He had this one at 04:20. Only takes blood pressure medications and Keppra. Lasted about 7-8 minutes and he has them every 2ish weeks. Bit his tongue during yesterday's seizure. No trauma because he was in bed. Feels a little dizzy currently with a mild headache.       The history is provided by the patient and medical records.     Review of patient's allergies indicates:  No Known Allergies  Past Medical History:   Diagnosis Date    Seizure      No past surgical history on file.  No family history on file.  Social History     Tobacco Use    Smoking status: Never Smoker    Smokeless tobacco: Never Used   Substance Use Topics    Alcohol use: Yes     Comment: occasional     Drug use: No     Review of Systems   Constitutional: Negative.    HENT: Negative.    Respiratory: Negative.    Cardiovascular: Negative.    Gastrointestinal: Negative.    Genitourinary: Negative.    Musculoskeletal: Negative.    Skin: Negative.  Negative for wound.   Neurological: Positive for dizziness, seizures and headaches.   Psychiatric/Behavioral: Negative.        Physical Exam     Initial Vitals [08/18/18 0459]   BP Pulse Resp Temp SpO2   (!) 146/80 94 16 98.2 °F (36.8 °C) 99 %      MAP       --         Physical Exam    Nursing note and vitals reviewed.  Constitutional: He appears well-developed and  well-nourished. No distress.   HENT:   Head: Normocephalic and atraumatic.   Mouth/Throat: Oropharynx is clear and moist.   Eyes: Conjunctivae are normal. Pupils are equal, round, and reactive to light.   Neck: Normal range of motion. Neck supple.   Cardiovascular: Normal rate, regular rhythm, normal heart sounds and intact distal pulses.   Pulmonary/Chest: Breath sounds normal. No respiratory distress.   Abdominal: Soft. He exhibits no distension. There is no tenderness.   Musculoskeletal:   Moves all extremities x 4   Neurological: He is alert and oriented to person, place, and time. He has normal strength. No cranial nerve deficit or sensory deficit.   Skin: Skin is warm and dry.   Psychiatric: He has a normal mood and affect. Thought content normal.         ED Course   Procedures  Labs Reviewed - No data to display       Imaging Results    None          Medical Decision Making:   History:   Old Medical Records: I decided to obtain old medical records.  Initial Assessment:   Patient here after   Clinical Tests:   Lab Tests: Ordered and Reviewed        Pt here with seizure similar to previous. No signs of infection.  Na, Glucose wnl.  Pt back at baseline.  Given tylenol for HA.  Hemodynamically stable.  Will d/c home for outpt neuro f/u.  Advised pt to follow up with PCP or return if concerning symptoms arise. Pt understands and agrees with plan. Will d/c home.          Scribe Attestation:   Scribe #1: I performed the above scribed service and the documentation accurately describes the services I performed. I attest to the accuracy of the note.               Clinical Impression:   There were no encounter diagnoses.                             Romel Gilliland MD  08/18/18 0621

## 2018-08-18 NOTE — ED TRIAGE NOTES
Michael Su, a 28 y.o. male presents to the ED after having witness seizure at home. Pt reports having two seizures, one yesterday and one at 3 AM today. Pt has hx of seizures and takes Keppra and Dilantin at home but has been out of medications for 3 days. Per wife, seizures lasted approx. 5 mins each. Last seizure prior to yesterday was 2 weeks ago. Pt denies falling, head trauma, incontinence or biting tongue. Pt AAOx4 at this time with no complaints.       Chief Complaint   Patient presents with    Seizures     reports having seizure in bed tonight, hx of seizures, reports compliance with home meds, pt AAOx4      Review of patient's allergies indicates:  No Known Allergies  Past Medical History:   Diagnosis Date    Hypertension     Seizure       Adult Physical Assessment  LOC: Michael Su, 28 y.o. male verified via two identifiers.  The patient is awake, alert, oriented and speaking appropriately at this time.  APPEARANCE: Patient resting comfortably and appears to be in no acute distress at this time. Patient is clean and well groomed, patient's clothing is properly fastened.  SKIN:The skin is warm and dry, color consistent with ethnicity, patient has normal skin turgor and moist mucus membranes, skin intact, no breakdown or brusing noted.  MUSCULOSKELETAL: Patient moving all extremities well, no obvious swelling or deformities noted.  RESPIRATORY: Airway is open and patent, respirations are spontaneous, patient has a normal effort and rate, no accessory muscle use noted.  CARDIAC: Patient has a normal rate and rhythm, no periphreal edema noted in any extremity, capillary refill < 3 seconds in all extremities  ABDOMEN: Soft and non tender to palpation, no abdominal distention noted. Bowel sounds present in all four quadrants.  NEUROLOGIC: Eyes open spontaneously, behavior appropriate to situation, follows commands, facial expression symmetrical, bilateral hand grasp equal and even, purposeful motor  response noted, normal sensation in all extremities when touched with a finger.

## 2019-02-16 ENCOUNTER — HOSPITAL ENCOUNTER (EMERGENCY)
Facility: HOSPITAL | Age: 29
Discharge: HOME | End: 2019-02-16
Attending: EMERGENCY MEDICINE
Payer: MEDICAID

## 2019-02-16 VITALS
HEART RATE: 71 BPM | SYSTOLIC BLOOD PRESSURE: 129 MMHG | WEIGHT: 225 LBS | OXYGEN SATURATION: 97 % | BODY MASS INDEX: 28.88 KG/M2 | HEIGHT: 74 IN | DIASTOLIC BLOOD PRESSURE: 70 MMHG | RESPIRATION RATE: 22 BRPM | TEMPERATURE: 97 F

## 2019-02-16 DIAGNOSIS — R56.9 SEIZURE: ICD-10-CM

## 2019-02-16 LAB
ALBUMIN SERPL BCP-MCNC: 4.2 G/DL
ALP SERPL-CCNC: 66 U/L
ALT SERPL W/O P-5'-P-CCNC: 14 U/L
ANION GAP SERPL CALC-SCNC: 12 MMOL/L
AST SERPL-CCNC: 23 U/L
BACTERIA #/AREA URNS AUTO: NORMAL /HPF
BASOPHILS # BLD AUTO: 0.02 K/UL
BASOPHILS NFR BLD: 0.1 %
BILIRUB SERPL-MCNC: 0.8 MG/DL
BILIRUB UR QL STRIP: NEGATIVE
BUN SERPL-MCNC: 12 MG/DL
CALCIUM SERPL-MCNC: 9.9 MG/DL
CHLORIDE SERPL-SCNC: 105 MMOL/L
CLARITY UR REFRACT.AUTO: CLEAR
CO2 SERPL-SCNC: 21 MMOL/L
COLOR UR AUTO: YELLOW
CREAT SERPL-MCNC: 1.3 MG/DL
DIFFERENTIAL METHOD: ABNORMAL
EOSINOPHIL # BLD AUTO: 0 K/UL
EOSINOPHIL NFR BLD: 0.2 %
ERYTHROCYTE [DISTWIDTH] IN BLOOD BY AUTOMATED COUNT: 13.9 %
EST. GFR  (AFRICAN AMERICAN): >60 ML/MIN/1.73 M^2
EST. GFR  (NON AFRICAN AMERICAN): >60 ML/MIN/1.73 M^2
GLUCOSE SERPL-MCNC: 101 MG/DL
GLUCOSE UR QL STRIP: NEGATIVE
HCT VFR BLD AUTO: 48.7 %
HGB BLD-MCNC: 15.4 G/DL
HGB UR QL STRIP: ABNORMAL
HYALINE CASTS UR QL AUTO: 1 /LPF
IMM GRANULOCYTES # BLD AUTO: 0.37 K/UL
IMM GRANULOCYTES NFR BLD AUTO: 1.7 %
KETONES UR QL STRIP: NEGATIVE
LEUKOCYTE ESTERASE UR QL STRIP: NEGATIVE
LYMPHOCYTES # BLD AUTO: 1.9 K/UL
LYMPHOCYTES NFR BLD: 9 %
MCH RBC QN AUTO: 30.7 PG
MCHC RBC AUTO-ENTMCNC: 31.6 G/DL
MCV RBC AUTO: 97 FL
MICROSCOPIC COMMENT: NORMAL
MONOCYTES # BLD AUTO: 0.9 K/UL
MONOCYTES NFR BLD: 4 %
NEUTROPHILS # BLD AUTO: 18.3 K/UL
NEUTROPHILS NFR BLD: 85 %
NITRITE UR QL STRIP: NEGATIVE
NRBC BLD-RTO: 0 /100 WBC
PH UR STRIP: 6 [PH] (ref 5–8)
PHENYTOIN SERPL-MCNC: <0.1 UG/ML
PLATELET # BLD AUTO: 249 K/UL
PMV BLD AUTO: 11 FL
POCT GLUCOSE: 190 MG/DL (ref 70–110)
POTASSIUM SERPL-SCNC: 4.7 MMOL/L
PROT SERPL-MCNC: 8 G/DL
PROT UR QL STRIP: ABNORMAL
RBC # BLD AUTO: 5.01 M/UL
RBC #/AREA URNS AUTO: 4 /HPF (ref 0–4)
SODIUM SERPL-SCNC: 138 MMOL/L
SP GR UR STRIP: 1.01 (ref 1–1.03)
SQUAMOUS #/AREA URNS AUTO: 0 /HPF
URN SPEC COLLECT METH UR: ABNORMAL
WBC # BLD AUTO: 21.53 K/UL
WBC #/AREA URNS AUTO: 4 /HPF (ref 0–5)

## 2019-02-16 PROCEDURE — 81001 URINALYSIS AUTO W/SCOPE: CPT

## 2019-02-16 PROCEDURE — 80177 DRUG SCRN QUAN LEVETIRACETAM: CPT

## 2019-02-16 PROCEDURE — 63600175 PHARM REV CODE 636 W HCPCS: Performed by: STUDENT IN AN ORGANIZED HEALTH CARE EDUCATION/TRAINING PROGRAM

## 2019-02-16 PROCEDURE — 25000003 PHARM REV CODE 250: Performed by: STUDENT IN AN ORGANIZED HEALTH CARE EDUCATION/TRAINING PROGRAM

## 2019-02-16 PROCEDURE — 80185 ASSAY OF PHENYTOIN TOTAL: CPT

## 2019-02-16 PROCEDURE — 93010 ELECTROCARDIOGRAM REPORT: CPT | Mod: ,,, | Performed by: INTERNAL MEDICINE

## 2019-02-16 PROCEDURE — 99285 EMERGENCY DEPT VISIT HI MDM: CPT | Mod: ,,, | Performed by: EMERGENCY MEDICINE

## 2019-02-16 PROCEDURE — 94761 N-INVAS EAR/PLS OXIMETRY MLT: CPT

## 2019-02-16 PROCEDURE — 93005 ELECTROCARDIOGRAM TRACING: CPT

## 2019-02-16 PROCEDURE — 96365 THER/PROPH/DIAG IV INF INIT: CPT

## 2019-02-16 PROCEDURE — 82962 GLUCOSE BLOOD TEST: CPT

## 2019-02-16 PROCEDURE — 80053 COMPREHEN METABOLIC PANEL: CPT

## 2019-02-16 PROCEDURE — 93010 EKG 12-LEAD: ICD-10-PCS | Mod: ,,, | Performed by: INTERNAL MEDICINE

## 2019-02-16 PROCEDURE — 99285 PR EMERGENCY DEPT VISIT,LEVEL V: ICD-10-PCS | Mod: ,,, | Performed by: EMERGENCY MEDICINE

## 2019-02-16 PROCEDURE — 85025 COMPLETE CBC W/AUTO DIFF WBC: CPT

## 2019-02-16 PROCEDURE — 99285 EMERGENCY DEPT VISIT HI MDM: CPT | Mod: 25

## 2019-02-16 RX ORDER — LEVETIRACETAM 5 MG/ML
500 INJECTION INTRAVASCULAR
Status: COMPLETED | OUTPATIENT
Start: 2019-02-16 | End: 2019-02-16

## 2019-02-16 RX ORDER — PHENYTOIN SODIUM 100 MG/1
400 CAPSULE, EXTENDED RELEASE ORAL
Status: COMPLETED | OUTPATIENT
Start: 2019-02-16 | End: 2019-02-16

## 2019-02-16 RX ORDER — LEVETIRACETAM 500 MG/1
500 TABLET ORAL 2 TIMES DAILY
Qty: 60 TABLET | Refills: 11 | Status: SHIPPED | OUTPATIENT
Start: 2019-02-16 | End: 2019-05-18 | Stop reason: SDUPTHER

## 2019-02-16 RX ORDER — PHENYTOIN SODIUM 100 MG/1
100 CAPSULE, EXTENDED RELEASE ORAL 3 TIMES DAILY
Qty: 90 CAPSULE | Refills: 11 | Status: SHIPPED | OUTPATIENT
Start: 2019-02-16 | End: 2019-05-18 | Stop reason: SDUPTHER

## 2019-02-16 RX ADMIN — PHENYTOIN SODIUM 400 MG: 100 CAPSULE ORAL at 12:02

## 2019-02-16 RX ADMIN — LEVETIRACETAM 500 MG: 5 INJECTION INTRAVENOUS at 02:02

## 2019-02-16 NOTE — ED TRIAGE NOTES
Patient presents via EMS s/p seizure x 2. EMS reports seizures witnessed by family and lasted ~2-4 minutes each. Patient arrives postictal but oriented to self and following commands.

## 2019-02-16 NOTE — ED NOTES
Patient resting in stretcher with NAD noted. VSS, following commands, and speech clear and appropriate. All belongings within reach. Patient denies any pain at this time. Patient updated on plan of care and all questions addressed. Safety precautions remain in place.

## 2019-02-16 NOTE — ED PROVIDER NOTES
Encounter Date: 2/16/2019       History     Chief Complaint   Patient presents with    Seizures     hx of seizure, witness by family, denies injury, duration approx x4 minutes, post ictal on EMS arrival, patient alert but confused on arrival, diaphoretic     Mr. Su is a 27 yo M with a PMH of seizures and hypertension who presents to the ED following a 2 witnessed seizures by his family. The seizures lasted 2-4 minutes. He remains very post ictal since the seizure episodes. He reports that he has not taken any seizure medications in the past 1.5 to 2 weeks as he ran out of his medication. Pt reports that he has not consisently taken seizures medications in over 4 months. He has not had seizure medications filled and has only received seizure medications in the ED since that time. Pts girlfriend reports that Pt had nausea and vomiting yesterday. Vomit was brown. It was never black or red. Pt had abdominal pain last night.           Review of patient's allergies indicates:  No Known Allergies  Past Medical History:   Diagnosis Date    Hypertension     Seizure      History reviewed. No pertinent surgical history.  History reviewed. No pertinent family history.  Social History     Tobacco Use    Smoking status: Current Every Day Smoker     Packs/day: 0.50    Smokeless tobacco: Never Used   Substance Use Topics    Alcohol use: Yes     Comment: occasional     Drug use: No     Review of Systems   Constitutional: Positive for fatigue. Negative for activity change, chills, diaphoresis and fever.   HENT: Negative for congestion, ear discharge, ear pain, mouth sores, rhinorrhea, sneezing, sore throat, tinnitus and voice change.    Eyes: Negative for photophobia and visual disturbance.   Respiratory: Negative for apnea, cough, choking and chest tightness.    Cardiovascular: Negative for chest pain, palpitations and leg swelling.   Gastrointestinal: Negative for abdominal distention, abdominal pain and vomiting.    Endocrine: Negative for polyphagia and polyuria.   Genitourinary: Negative for difficulty urinating.   Musculoskeletal: Negative for arthralgias.   Neurological: Negative for dizziness, facial asymmetry and headaches.   Psychiatric/Behavioral: Negative for agitation and confusion.       Physical Exam     Initial Vitals [02/16/19 0830]   BP Pulse Resp Temp SpO2   130/78 (!) 118 (!) 22 97.4 °F (36.3 °C) 100 %      MAP       --         Physical Exam    Constitutional: He appears well-developed and well-nourished. He is diaphoretic.   HENT:   Head: Atraumatic.   Mouth/Throat: Oropharynx is clear and moist. No oropharyngeal exudate.   Eyes: Conjunctivae are normal. Pupils are equal, round, and reactive to light.   Neck: No tracheal deviation present. No JVD present.   Cardiovascular: Normal rate, normal heart sounds and intact distal pulses.   No murmur heard.  Pulmonary/Chest: Breath sounds normal. No respiratory distress. He has no wheezes.   Abdominal: Soft. Bowel sounds are normal. He exhibits no distension.   Musculoskeletal: Normal range of motion.   Neurological: He is alert. He has normal reflexes.   Post-ictal  lethargic   Skin: Skin is warm.   Psychiatric:   Drowsy + post ictal         ED Course   Procedures  Labs Reviewed   CBC W/ AUTO DIFFERENTIAL - Abnormal; Notable for the following components:       Result Value    WBC 21.53 (*)     MCHC 31.6 (*)     Immature Granulocytes 1.7 (*)     Gran # (ANC) 18.3 (*)     Immature Grans (Abs) 0.37 (*)     Gran% 85.0 (*)     Lymph% 9.0 (*)     All other components within normal limits   URINALYSIS, REFLEX TO URINE CULTURE - Abnormal; Notable for the following components:    Protein, UA 2+ (*)     Occult Blood UA 1+ (*)     All other components within normal limits    Narrative:     Preferred Collection Type->Urine, Clean Catch   COMPREHENSIVE METABOLIC PANEL - Abnormal; Notable for the following components:    CO2 21 (*)     All other components within normal limits    PHENYTOIN LEVEL, TOTAL - Abnormal; Notable for the following components:    Phenytoin Lvl <0.1 (*)     All other components within normal limits   POCT GLUCOSE - Abnormal; Notable for the following components:    POCT Glucose 190 (*)     All other components within normal limits   LEVETIRACETAM  (KEPPRA) LEVEL   PHENYTOIN LEVEL, TOTAL AND FREE   URINALYSIS MICROSCOPIC    Narrative:     Preferred Collection Type->Urine, Clean Catch   LEVETIRACETAM  (KEPPRA) LEVEL    Narrative:     add on leve as per dr. jael hall  02/16/2019  10:44    LEVETIRACETAM  (KEPPRA) LEVEL   PHENYTOIN LEVEL, TOTAL AND FREE     EKG Readings: (Independently Interpreted)   Nsr, rate 82, no acute ischemic changes     ECG Results          EKG 12-lead (Final result)  Result time 02/16/19 23:06:22    Final result by Interface, Lab In [facility] (02/16/19 23:06:22)                 Narrative:    Test Reason : R56.9,    Vent. Rate : 082 BPM     Atrial Rate : 082 BPM     P-R Int : 134 ms          QRS Dur : 084 ms      QT Int : 364 ms       P-R-T Axes : 074 086 079 degrees     QTc Int : 425 ms    Normal sinus rhythm  Minimal voltage criteria for LVH, may be normal variant  Early repolarization  Borderline Abnormal ECG  When compared with ECG of 10-JUL-2018 09:08,  Nonspecific T wave abnormality no longer evident in Inferior leads  Nonspecific T wave abnormality no longer evident in Lateral leads  Confirmed by Aldo Conway MD ([id number]) on 2/16/2019 11:06:07 PM    Referred By: System System           Confirmed By:Aldo Conway MD                            Imaging Results          X-Ray Chest PA And Lateral (Final result)  Result time 02/16/19 12:44:16    Final result by Keaton Aguilera MD (02/16/19 12:44:16)                 Impression:      Ill-defined airspace opacity in the right middle lobe, consider aspiration, atelectasis or pneumonia.  Clinical correlation and follow-up advised.      Electronically signed by: Keaton Aguilera  MD  Date:    02/16/2019  Time:    12:44             Narrative:    EXAMINATION:  XR CHEST PA AND LATERAL    CLINICAL HISTORY:  Unspecified convulsions    TECHNIQUE:  PA and lateral views of the chest were performed.    COMPARISON:  None    FINDINGS:  Multiple overlying cardiac monitoring leads.  Ill-defined opacity in the right middle lobe, considerthe cardiomediastinal silhouette is normal in size and midline. Pulmonary vascularity appears within normal limits.    Ill-defined opacity in the right middle lobe without evidence of lobar consolidation.  Left lung is clear.  No pleural fluid or pneumothorax    Osseous structures appear intact.                              X-Rays:   Independently Interpreted Readings:   Chest X-Ray: No infiltrates.  No acute abnormalities.     Medical Decision Making:   Initial Assessment:   Pt is a 29 yo M who presented after 2 witnessed seizures lasting about 2-4 minutes. Pt reports he has not taken any seizure medications in the past 1.5-2 weeks. Pt unable to recall medications. Pt continues to be post-ictal on exam.   Differential Diagnosis:   Epileptic seizure, lack of medication compliance, low medication levels, drug toxicity, dehydration, infection   Independently Interpreted Test(s):   I have ordered and independently interpreted X-rays - see prior notes.  I have ordered and independently interpreted EKG Reading(s) - see prior notes  Clinical Tests:   Lab Tests: Ordered and Reviewed  Radiological Study: Ordered and Reviewed  Medical Tests: Ordered and Reviewed  ED Management:  CBC  CMP  UA  EKG  keppa level  Phenytoin level    -pt initially very post ictal but after 2 hours more verbal and alert    12:55 PM  cxr - wnl  -phenytoin level low. Loaded with 400mg of phenytoin    2:30 PM  Pt received phenytoin + keppra              Attending Attestation:   Physician Attestation Statement for Resident:  As the supervising MD   Physician Attestation Statement: I have personally seen and  examined this patient.   I agree with the above history. -:   As the supervising MD I agree with the above PE.    As the supervising MD I agree with the above treatment, course, plan, and disposition.   -: 27 y/o M known seizure d/o presents after witnessed sz at home. Initially postictal but after observation pt awake and alert. Admits to non-adherence to sz medications. Infection rule out negative. Pt tz wit hphenytoin load and keppra restarted at usual home dose. F/u with neurology.  I have reviewed and agree with the residents interpretation of the following: lab data, EKG and x-rays.                       Clinical Impression:       ICD-10-CM ICD-9-CM   1. Seizure R56.9 780.39         Disposition:   Disposition: Discharged  Condition: Stable                        Ritu Medrano MD  02/21/19 7570

## 2019-02-16 NOTE — DISCHARGE INSTRUCTIONS
Please establish care with a neurologist at [facility] with a neurologist. Please take your medications as prescribed. Please return to the ED if you develop refractory seizures or high fever.

## 2019-02-19 LAB — LEVETIRACETAM SERPL-MCNC: <1 UG/ML (ref 3–60)

## 2019-05-18 ENCOUNTER — HOSPITAL ENCOUNTER (EMERGENCY)
Facility: HOSPITAL | Age: 29
Discharge: HOME | End: 2019-05-18
Attending: EMERGENCY MEDICINE
Payer: MEDICAID

## 2019-05-18 VITALS
WEIGHT: 250 LBS | HEIGHT: 74 IN | BODY MASS INDEX: 32.08 KG/M2 | OXYGEN SATURATION: 100 % | SYSTOLIC BLOOD PRESSURE: 112 MMHG | DIASTOLIC BLOOD PRESSURE: 64 MMHG | RESPIRATION RATE: 17 BRPM | TEMPERATURE: 98 F | HEART RATE: 54 BPM

## 2019-05-18 DIAGNOSIS — G40.909 SEIZURE DISORDER: Primary | ICD-10-CM

## 2019-05-18 LAB
BUN SERPL-MCNC: 11 MG/DL (ref 6–30)
CHLORIDE SERPL-SCNC: 106 MMOL/L (ref 95–110)
CREAT SERPL-MCNC: 1.1 MG/DL (ref 0.5–1.4)
GLUCOSE SERPL-MCNC: 63 MG/DL (ref 70–110)
HCT VFR BLD CALC: 47 %PCV (ref 36–54)
PHENYTOIN SERPL-MCNC: <0.1 UG/ML (ref 10–20)
POC IONIZED CALCIUM: 1.21 MMOL/L (ref 1.06–1.42)
POC TCO2 (MEASURED): 21 MMOL/L (ref 23–29)
POTASSIUM BLD-SCNC: 4 MMOL/L (ref 3.5–5.1)
SAMPLE: ABNORMAL
SODIUM BLD-SCNC: 143 MMOL/L (ref 136–145)

## 2019-05-18 PROCEDURE — 99284 PR EMERGENCY DEPT VISIT,LEVEL IV: ICD-10-PCS | Mod: ,,, | Performed by: EMERGENCY MEDICINE

## 2019-05-18 PROCEDURE — 96365 THER/PROPH/DIAG IV INF INIT: CPT

## 2019-05-18 PROCEDURE — 63600175 PHARM REV CODE 636 W HCPCS: Performed by: EMERGENCY MEDICINE

## 2019-05-18 PROCEDURE — 80177 DRUG SCRN QUAN LEVETIRACETAM: CPT

## 2019-05-18 PROCEDURE — 25000003 PHARM REV CODE 250: Performed by: EMERGENCY MEDICINE

## 2019-05-18 PROCEDURE — 25000003 PHARM REV CODE 250: Performed by: STUDENT IN AN ORGANIZED HEALTH CARE EDUCATION/TRAINING PROGRAM

## 2019-05-18 PROCEDURE — 99284 EMERGENCY DEPT VISIT MOD MDM: CPT

## 2019-05-18 PROCEDURE — 99284 EMERGENCY DEPT VISIT MOD MDM: CPT | Mod: ,,, | Performed by: EMERGENCY MEDICINE

## 2019-05-18 PROCEDURE — 80047 BASIC METABLC PNL IONIZED CA: CPT

## 2019-05-18 PROCEDURE — 80185 ASSAY OF PHENYTOIN TOTAL: CPT

## 2019-05-18 RX ORDER — LEVETIRACETAM 500 MG/1
500 TABLET ORAL 2 TIMES DAILY
Qty: 60 TABLET | Refills: 0 | Status: SHIPPED | OUTPATIENT
Start: 2019-05-18 | End: 2019-06-17

## 2019-05-18 RX ORDER — PHENYTOIN 125 MG/5ML
500 SUSPENSION ORAL
Status: COMPLETED | OUTPATIENT
Start: 2019-05-18 | End: 2019-05-18

## 2019-05-18 RX ORDER — PHENYTOIN 125 MG/5ML
400 SUSPENSION ORAL
Status: COMPLETED | OUTPATIENT
Start: 2019-05-18 | End: 2019-05-18

## 2019-05-18 RX ORDER — PHENYTOIN SODIUM 100 MG/1
100 CAPSULE, EXTENDED RELEASE ORAL 3 TIMES DAILY
Qty: 90 CAPSULE | Refills: 0 | Status: SHIPPED | OUTPATIENT
Start: 2019-05-18 | End: 2019-06-17

## 2019-05-18 RX ORDER — LEVETIRACETAM 15 MG/ML
1500 INJECTION INTRAVASCULAR
Status: COMPLETED | OUTPATIENT
Start: 2019-05-18 | End: 2019-05-18

## 2019-05-18 RX ADMIN — PHENYTOIN 400 MG: 125 SUSPENSION ORAL at 11:05

## 2019-05-18 RX ADMIN — LEVETIRACETAM INJECTION 1500 MG: 15 INJECTION INTRAVENOUS at 08:05

## 2019-05-18 RX ADMIN — PHENYTOIN 500 MG: 125 SUSPENSION ORAL at 08:05

## 2019-05-18 NOTE — ED PROVIDER NOTES
Encounter Date: 5/18/2019       History     Chief Complaint   Patient presents with    Seizures     Non-compliant with Keppra     Mr. Su is a 28 year old man with medical history of seizures that presents w/ seizures. Patient appears to be in post-ictal state and cannot provide detailed history. He did report that he ran out of his medications, Keppra and Dilantin, about a week ago. He has not been sick recently. He is a current smoker, smoking about 4 cigarettes a day for the past 10 years. He uses THC, about once everyday with the last use being about 2 weeks ago.     He had 2 seizures this morning. First seizure was around 6:25AM, went towards the bathroom, landed on the floor, hit his head, arms and legs moving around. Episode lasted about 15 minutes. He had bleeding from his mouth. The second one: he started to jerk but then stopped. It lasted about 4-5 seconds. That's when wife called EMS. He had a seizure about a week ago but didn't want to come to the hospital. It appears that patient does not have PCP or Neurologist that prescribes his seizure medications.     The history is limited by the condition of the patient.     Review of patient's allergies indicates:  No Known Allergies  Past Medical History:   Diagnosis Date    Hypertension     Seizure      History reviewed. No pertinent surgical history.  History reviewed. No pertinent family history.  Social History     Tobacco Use    Smoking status: Current Every Day Smoker     Packs/day: 0.50    Smokeless tobacco: Never Used   Substance Use Topics    Alcohol use: Yes     Comment: occasional     Drug use: Yes     Types: Marijuana     Review of Systems   Constitutional: Negative for chills and fever.   HENT: Negative for congestion and sore throat.    Eyes: Negative for photophobia and visual disturbance.   Respiratory: Negative for cough and shortness of breath.    Cardiovascular: Negative for chest pain and leg swelling.   Gastrointestinal:  Negative for abdominal pain and nausea.   Genitourinary: Negative for difficulty urinating and dysuria.   Musculoskeletal: Negative for back pain and neck pain.   Skin: Negative for pallor and rash.   Neurological: Positive for seizures. Negative for headaches.   Psychiatric/Behavioral: Negative for agitation and confusion.       Physical Exam     Initial Vitals [05/18/19 0653]   BP Pulse Resp Temp SpO2   135/85 97 16 97.7 °F (36.5 °C) 100 %      MAP       --         Physical Exam    Nursing note and vitals reviewed.  Constitutional: He appears well-developed and well-nourished. No distress.   HENT:   Head: Normocephalic and atraumatic.   Mouth/Throat: Lacerations (on right side of tongue) present.   Eyes: EOM are normal.   Neck: Neck supple.   Cardiovascular: Normal rate and regular rhythm.   Pulmonary/Chest: No respiratory distress. He has no wheezes.   Abdominal: Soft. He exhibits no distension. There is no tenderness.   Neurological: He is oriented to person, place, and time.   Appears to be post-ictal, drowsy   Skin: Skin is warm and dry.         ED Course   Procedures  Labs Reviewed   PHENYTOIN LEVEL, TOTAL - Abnormal; Notable for the following components:       Result Value    Phenytoin Lvl <0.1 (*)     All other components within normal limits   POCT GLUCOSE - Abnormal; Notable for the following components:    POC Glucose 63 (*)     POC TCO2 (MEASURED) 21 (*)     All other components within normal limits   LEVETIRACETAM  (KEPPRA) LEVEL   ISTAT CHEM8          Imaging Results    None          Medical Decision Making:   History:   I obtained history from: someone other than patient.       <> Summary of History: Please see HPI  Initial Assessment:   Mr. Su is a 28 year old man with medical history of seizures that presents w/ seizures. My differentials include but are not limited to epileptic seizure, lack of medication compliance, low medication levels, drug toxicity, dehydration, infection. From  history, it appears seizures occurred due to medication non-compliance. Patient will be loaded with Keppra and Dilantin, and these levels will be drawn as well. Istat shows slowly low glucose so patient will be given apple juice.    Per [place] head CT criteria, patient does not warrant a head CT at this time.    Clinical Tests:   Lab Tests: Ordered and Reviewed       APC / Resident Notes:   9:59 AM  Patient's phenytoin level is low. He will receive second load of phenytoin after 2 hours of first load. Received first dose of Keppra.    10:40 AM  Patient received second dose of phenytoin (400mg). Patient's wife states that he usually sleeps into the morning, and it is normal for him to be tired in the morning.     11:22 AM  Patient appears more awake and alert. Looking back at previous visit, it appears he was prescribed Dilantin and Keppra with 11 re-fills. States that he goes to [facility] on [facility]. Printed out another month prescription. As patient was more awake and alert with no signs of infection, patient was discharged home. Vitals remained stable.      Phone number for [facility] provided in discharge papers for patient to call and make an appointment with a PCP so that he can follow up and have assistance in managing seizure medications.     Patient seen and case discussed with Dr. Arias.         Attending Attestation:   Physician Attestation Statement for Resident:  As the supervising MD   Physician Attestation Statement: I have personally seen and examined this patient.   I agree with the above history. -: 27 yo m, h/o seizures, on keppra and dilantin, noncompliant w meds, s/p 2 seizures today, witnessed.  Arrived postictal but easily arousable, normal neuro exam.  Plan for administration of antiepileptic meds, observation until return to completely normal MS, likely discharge home   As the supervising MD I agree with the above PE.    As the supervising MD I agree with the  above treatment, course, plan, and disposition.  I have reviewed and agree with the residents interpretation of the following: lab data.  I have reviewed the following: old records at this facility.                       Clinical Impression:       ICD-10-CM ICD-9-CM   1. Seizure disorder G40.909 345.90         Disposition:   Disposition: Discharged  Condition: Stable                        Delaney Stevenson MD  Resident  05/18/19 6825

## 2019-05-18 NOTE — DISCHARGE INSTRUCTIONS
[facility]: [phone]  [facility]: [phone]  Please call numbers above to make an appointment for PCP and Neurologist in help with seizure medication management.

## 2019-05-18 NOTE — ED NOTES
.  Patient identifiers for Michael Su 28 y.o. male checked and correct.  Chief Complaint   Patient presents with    Seizures     Non-compliant with Keppra     Past Medical History:   Diagnosis Date    Hypertension     Seizure      Allergies reported: Review of patient's allergies indicates:  No Known Allergies      LOC: Patient is awake, alert, and aware of environment with an appropriate affect. Patient is oriented x 3 and speaking appropriately.  APPEARANCE: Patient resting comfortably and in no acute distress. Patient is clean and well groomed, patient's clothing is properly fastened.  SKIN: The skin is warm and dry. Patient has normal skin turgor and moist mucus membranes. Skin is intact; no bruising or breakdown noted.  MUSKULOSKELETAL: Patient is moving all extremities well, no obvious deformities noted. Pulses intact.   RESPIRATORY: Airway is open and patent. Respirations are spontaneous and non-labored with normal effort and rate, BBS=clear  CARDIAC: Patient has a normal rate and rhythm. NSR on cardiac monitor,No peripheral edema noted.   ABDOMEN: No distention noted. Bowel sounds active in all 4 quadrants. Soft and non-tender upon palpation.  NEUROLOGICAL: pupils 3 mm, PERRL. Facial expression is symmetrical. Hand grasps are equal bilaterally. Normal sensation in all extremities when touched with finger. Pt had witnessed seizure by wife this am, she reports seizure like activity being jerking of body and extremities, fell to the floor. Pt reports he has not taken his seizure meds for 1-2 wks. Wife reports last seizure a week ago.

## 2019-05-20 LAB — LEVETIRACETAM SERPL-MCNC: <1 UG/ML (ref 3–60)

## 2020-08-10 PROBLEM — R10.13 EPIGASTRIC PAIN: Status: ACTIVE | Noted: 2020-08-10

## 2022-05-25 ENCOUNTER — HOSPITAL ENCOUNTER (EMERGENCY)
Facility: HOSPITAL | Age: 32
Discharge: HOME | End: 2022-05-25
Attending: EMERGENCY MEDICINE
Payer: MEDICAID

## 2022-05-25 VITALS
DIASTOLIC BLOOD PRESSURE: 72 MMHG | RESPIRATION RATE: 18 BRPM | HEART RATE: 80 BPM | SYSTOLIC BLOOD PRESSURE: 133 MMHG | TEMPERATURE: 98 F | OXYGEN SATURATION: 98 %

## 2022-05-25 DIAGNOSIS — R41.82 ALTERED MENTAL STATUS: ICD-10-CM

## 2022-05-25 DIAGNOSIS — Z91.199 NON-ADHERENCE TO MEDICAL TREATMENT: ICD-10-CM

## 2022-05-25 DIAGNOSIS — R56.9 SEIZURE: Primary | ICD-10-CM

## 2022-05-25 LAB
ALBUMIN SERPL BCP-MCNC: 3.7 G/DL (ref 3.5–5.2)
ALP SERPL-CCNC: 53 U/L (ref 55–135)
ALT SERPL W/O P-5'-P-CCNC: 15 U/L (ref 10–44)
AMPHET+METHAMPHET UR QL: ABNORMAL
ANION GAP SERPL CALC-SCNC: 9 MMOL/L (ref 8–16)
AST SERPL-CCNC: 20 U/L (ref 10–40)
BARBITURATES UR QL SCN>200 NG/ML: NEGATIVE
BASOPHILS # BLD AUTO: 0.02 K/UL (ref 0–0.2)
BASOPHILS NFR BLD: 0.3 % (ref 0–1.9)
BENZODIAZ UR QL SCN>200 NG/ML: NEGATIVE
BILIRUB SERPL-MCNC: 0.2 MG/DL (ref 0.1–1)
BILIRUB UR QL STRIP: NEGATIVE
BUN SERPL-MCNC: 10 MG/DL (ref 6–20)
BZE UR QL SCN: NEGATIVE
CALCIUM SERPL-MCNC: 9.2 MG/DL (ref 8.7–10.5)
CANNABINOIDS UR QL SCN: ABNORMAL
CARBAMAZEPINE SERPL-MCNC: <1.9 UG/ML (ref 4–12)
CHLORIDE SERPL-SCNC: 107 MMOL/L (ref 95–110)
CLARITY UR REFRACT.AUTO: CLEAR
CO2 SERPL-SCNC: 26 MMOL/L (ref 23–29)
COLOR UR AUTO: NORMAL
CREAT SERPL-MCNC: 0.9 MG/DL (ref 0.5–1.4)
CREAT UR-MCNC: 73 MG/DL (ref 23–375)
CTP QC/QA: YES
DIFFERENTIAL METHOD: ABNORMAL
EOSINOPHIL # BLD AUTO: 0 K/UL (ref 0–0.5)
EOSINOPHIL NFR BLD: 0.6 % (ref 0–8)
ERYTHROCYTE [DISTWIDTH] IN BLOOD BY AUTOMATED COUNT: 13.2 % (ref 11.5–14.5)
EST. GFR  (AFRICAN AMERICAN): >60 ML/MIN/1.73 M^2
EST. GFR  (NON AFRICAN AMERICAN): >60 ML/MIN/1.73 M^2
ETHANOL SERPL-MCNC: <10 MG/DL
GLUCOSE SERPL-MCNC: 80 MG/DL (ref 70–110)
GLUCOSE UR QL STRIP: NEGATIVE
HCT VFR BLD AUTO: 39.1 % (ref 40–54)
HGB BLD-MCNC: 13.3 G/DL (ref 14–18)
HGB UR QL STRIP: NEGATIVE
IMM GRANULOCYTES # BLD AUTO: 0.01 K/UL (ref 0–0.04)
IMM GRANULOCYTES NFR BLD AUTO: 0.1 % (ref 0–0.5)
KETONES UR QL STRIP: NEGATIVE
LEUKOCYTE ESTERASE UR QL STRIP: NEGATIVE
LYMPHOCYTES # BLD AUTO: 1.7 K/UL (ref 1–4.8)
LYMPHOCYTES NFR BLD: 24.5 % (ref 18–48)
MCH RBC QN AUTO: 32 PG (ref 27–31)
MCHC RBC AUTO-ENTMCNC: 34 G/DL (ref 32–36)
MCV RBC AUTO: 94 FL (ref 82–98)
METHADONE UR QL SCN>300 NG/ML: NEGATIVE
MONOCYTES # BLD AUTO: 0.5 K/UL (ref 0.3–1)
MONOCYTES NFR BLD: 7.9 % (ref 4–15)
NEUTROPHILS # BLD AUTO: 4.5 K/UL (ref 1.8–7.7)
NEUTROPHILS NFR BLD: 66.6 % (ref 38–73)
NITRITE UR QL STRIP: NEGATIVE
NRBC BLD-RTO: 0 /100 WBC
OPIATES UR QL SCN: NEGATIVE
PCP UR QL SCN>25 NG/ML: NEGATIVE
PH UR STRIP: 6 [PH] (ref 5–8)
PHENYTOIN SERPL-MCNC: <1.8 UG/ML (ref 10–20)
PLATELET # BLD AUTO: 228 K/UL (ref 150–450)
PMV BLD AUTO: 10.3 FL (ref 9.2–12.9)
POCT GLUCOSE: 91 MG/DL (ref 70–110)
POTASSIUM SERPL-SCNC: 3.8 MMOL/L (ref 3.5–5.1)
PROT SERPL-MCNC: 7 G/DL (ref 6–8.4)
PROT UR QL STRIP: NEGATIVE
RBC # BLD AUTO: 4.15 M/UL (ref 4.6–6.2)
SARS-COV-2 RDRP RESP QL NAA+PROBE: NEGATIVE
SODIUM SERPL-SCNC: 142 MMOL/L (ref 136–145)
SP GR UR STRIP: 1.01 (ref 1–1.03)
TOXICOLOGY INFORMATION: ABNORMAL
TSH SERPL DL<=0.005 MIU/L-ACNC: 1.54 UIU/ML (ref 0.4–4)
URN SPEC COLLECT METH UR: NORMAL
WBC # BLD AUTO: 6.73 K/UL (ref 3.9–12.7)

## 2022-05-25 PROCEDURE — 96374 THER/PROPH/DIAG INJ IV PUSH: CPT

## 2022-05-25 PROCEDURE — 93005 ELECTROCARDIOGRAM TRACING: CPT

## 2022-05-25 PROCEDURE — 63600175 PHARM REV CODE 636 W HCPCS: Performed by: EMERGENCY MEDICINE

## 2022-05-25 PROCEDURE — 85025 COMPLETE CBC W/AUTO DIFF WBC: CPT | Performed by: EMERGENCY MEDICINE

## 2022-05-25 PROCEDURE — 96372 THER/PROPH/DIAG INJ SC/IM: CPT | Performed by: EMERGENCY MEDICINE

## 2022-05-25 PROCEDURE — U0002 COVID-19 LAB TEST NON-CDC: HCPCS | Performed by: EMERGENCY MEDICINE

## 2022-05-25 PROCEDURE — 81003 URINALYSIS AUTO W/O SCOPE: CPT | Mod: 59 | Performed by: EMERGENCY MEDICINE

## 2022-05-25 PROCEDURE — 99285 PR EMERGENCY DEPT VISIT,LEVEL V: ICD-10-PCS | Mod: CS,,, | Performed by: EMERGENCY MEDICINE

## 2022-05-25 PROCEDURE — 93010 EKG 12-LEAD: ICD-10-PCS | Mod: ,,, | Performed by: INTERNAL MEDICINE

## 2022-05-25 PROCEDURE — 80185 ASSAY OF PHENYTOIN TOTAL: CPT | Performed by: EMERGENCY MEDICINE

## 2022-05-25 PROCEDURE — 93010 ELECTROCARDIOGRAM REPORT: CPT | Mod: ,,, | Performed by: INTERNAL MEDICINE

## 2022-05-25 PROCEDURE — 80156 ASSAY CARBAMAZEPINE TOTAL: CPT | Performed by: EMERGENCY MEDICINE

## 2022-05-25 PROCEDURE — 94761 N-INVAS EAR/PLS OXIMETRY MLT: CPT

## 2022-05-25 PROCEDURE — 80053 COMPREHEN METABOLIC PANEL: CPT | Performed by: EMERGENCY MEDICINE

## 2022-05-25 PROCEDURE — 80307 DRUG TEST PRSMV CHEM ANLYZR: CPT | Performed by: EMERGENCY MEDICINE

## 2022-05-25 PROCEDURE — 99285 EMERGENCY DEPT VISIT HI MDM: CPT | Mod: 25

## 2022-05-25 PROCEDURE — 25000003 PHARM REV CODE 250: Performed by: EMERGENCY MEDICINE

## 2022-05-25 PROCEDURE — 82962 GLUCOSE BLOOD TEST: CPT

## 2022-05-25 PROCEDURE — 82077 ASSAY SPEC XCP UR&BREATH IA: CPT | Performed by: EMERGENCY MEDICINE

## 2022-05-25 PROCEDURE — 84443 ASSAY THYROID STIM HORMONE: CPT | Performed by: EMERGENCY MEDICINE

## 2022-05-25 PROCEDURE — 99285 EMERGENCY DEPT VISIT HI MDM: CPT | Mod: CS,,, | Performed by: EMERGENCY MEDICINE

## 2022-05-25 RX ORDER — PHENYTOIN SODIUM 100 MG/1
200 CAPSULE, EXTENDED RELEASE ORAL
Status: COMPLETED | OUTPATIENT
Start: 2022-05-25 | End: 2022-05-25

## 2022-05-25 RX ORDER — LEVETIRACETAM 500 MG/1
500 TABLET ORAL 2 TIMES DAILY
Qty: 60 TABLET | Refills: 11 | OUTPATIENT
Start: 2022-05-25 | End: 2023-03-31

## 2022-05-25 RX ORDER — PHENYTOIN SODIUM 100 MG/1
100 CAPSULE, EXTENDED RELEASE ORAL
Status: DISCONTINUED | OUTPATIENT
Start: 2022-05-25 | End: 2022-05-25

## 2022-05-25 RX ORDER — LEVETIRACETAM 500 MG/5ML
2000 INJECTION, SOLUTION, CONCENTRATE INTRAVENOUS
Status: COMPLETED | OUTPATIENT
Start: 2022-05-25 | End: 2022-05-25

## 2022-05-25 RX ORDER — PHENYTOIN SODIUM 100 MG/1
200 CAPSULE, EXTENDED RELEASE ORAL DAILY
Qty: 60 CAPSULE | Refills: 0 | OUTPATIENT
Start: 2022-05-25 | End: 2023-03-31

## 2022-05-25 RX ORDER — LORAZEPAM 2 MG/ML
2 INJECTION INTRAMUSCULAR
Status: COMPLETED | OUTPATIENT
Start: 2022-05-25 | End: 2022-05-25

## 2022-05-25 RX ORDER — PHENYTOIN SODIUM 300 MG/1
300 CAPSULE, EXTENDED RELEASE ORAL NIGHTLY
Qty: 20 CAPSULE | Refills: 0 | OUTPATIENT
Start: 2022-05-25 | End: 2023-03-31

## 2022-05-25 RX ADMIN — PHENYTOIN SODIUM 200 MG: 100 CAPSULE ORAL at 06:05

## 2022-05-25 RX ADMIN — LEVETIRACETAM 2000 MG: 500 INJECTION, SOLUTION INTRAVENOUS at 06:05

## 2022-05-25 RX ADMIN — LORAZEPAM 2 MG: 2 INJECTION INTRAMUSCULAR; INTRAVENOUS at 04:05

## 2022-05-25 NOTE — ED PROVIDER NOTES
Encounter Date: 2022       History     Chief Complaint   Patient presents with    Altered Mental Status     Pt brought in by ems for altered mental status and disorientation. Pt was recently admitted for seizures. Pt reports taking ectacy today.      HPI   30 y/o M with medical history of seizure d/o (per mother since childhood) bibems for altered mental status. Per EMS witnessed seizure but no more information available. Pt admits to doing ectasy earlier today. States he takes his seizure medicines as prescribed but states he takes keppra and dilantin but he is not able to tell me dosage.  Pt hs no complaints. Repeatedly stating he wants to go out to smoke.     Per mother via telephone pt recently admitted for seizures. She is not sure what happened today as pt was with his girlfriend and her phone is broken but states pt has had more seizures recently    Review of patient's allergies indicates:  No Known Allergies     Past Medical History:   Diagnosis Date    Anemia     Hypertension     Seizures      No past surgical history on file.  No family history on file.  Social History     Tobacco Use    Smoking status: Former Smoker     Quit date: 8/10/2019     Years since quittin.7    Smokeless tobacco: Never Used   Substance Use Topics    Alcohol use: Never    Drug use: Never     Review of Systems   Constitutional: Negative for fatigue and fever.   HENT: Negative for sore throat.    Eyes: Negative for visual disturbance.   Respiratory: Negative for chest tightness and shortness of breath.    Cardiovascular: Negative for chest pain and leg swelling.   Gastrointestinal: Negative for abdominal pain, nausea and vomiting.   Genitourinary: Negative for dysuria.   Musculoskeletal: Negative for back pain and neck pain.   Neurological: Positive for seizures. Negative for dizziness, weakness, light-headedness and headaches.       Physical Exam     Initial Vitals   BP Pulse Resp Temp SpO2   22 0213 22  0213 05/25/22 0213 05/25/22 0217 05/25/22 0213   (!) 134/94 82 16 98.5 °F (36.9 °C) 100 %      MAP       --                Physical Exam    Nursing note and vitals reviewed.  Constitutional: He appears well-developed and well-nourished. He is not diaphoretic. No distress.   HENT:   Head: Normocephalic and atraumatic.   Mouth/Throat: Oropharynx is clear and moist.   Eyes: Conjunctivae are normal. Pupils are equal, round, and reactive to light.   Neck: Neck supple.   Normal range of motion.  Cardiovascular: Normal rate, regular rhythm, normal heart sounds and intact distal pulses.   No murmur heard.  Pulmonary/Chest: Breath sounds normal. No respiratory distress. He has no wheezes. He has no rales.   Abdominal: Abdomen is soft. Bowel sounds are normal. He exhibits no distension. There is no abdominal tenderness. There is no rebound.   Musculoskeletal:      Cervical back: Normal range of motion and neck supple.     Neurological: He is alert and oriented to person, place, and time.   Skin: Skin is warm and dry.         ED Course   Procedures  Labs Reviewed   CBC W/ AUTO DIFFERENTIAL - Abnormal; Notable for the following components:       Result Value    RBC 4.15 (*)     Hemoglobin 13.3 (*)     Hematocrit 39.1 (*)     MCH 32.0 (*)     All other components within normal limits   COMPREHENSIVE METABOLIC PANEL - Abnormal; Notable for the following components:    Alkaline Phosphatase 53 (*)     All other components within normal limits   CARBAMAZEPINE LEVEL, TOTAL - Abnormal; Notable for the following components:    Carbamazepine Lvl <1.9 (*)     All other components within normal limits   PHENYTOIN LEVEL, TOTAL - Abnormal; Notable for the following components:    Phenytoin Lvl <1.8 (*)     All other components within normal limits   DRUG SCREEN PANEL, URINE EMERGENCY - Abnormal; Notable for the following components:    Amphetamine Screen, Ur Presumptive Positive (*)     THC Presumptive Positive (*)     All other components  within normal limits    Narrative:     Specimen Source->Urine   URINALYSIS, REFLEX TO URINE CULTURE    Narrative:     Specimen Source->Urine   TSH   ALCOHOL,MEDICAL (ETHANOL)   ACETAMINOPHEN LEVEL   TSH    Narrative:     add and TSH AND ACETM PER DR.JILL BLANC /ORDER#[id number]  AND [id number] @ 05/25/2022  05:24    ALCOHOL,MEDICAL (ETHANOL)   ACETAMINOPHEN LEVEL   SARS-COV-2 RDRP GENE   POCT GLUCOSE     EKG Readings: (Independently Interpreted)   Sinus 64, no acute ischemic changes     ECG Results          EKG 12-lead (Final result)  Result time 05/25/22 08:47:07    Final result by Interface, Lab In [facility] (05/25/22 08:47:07)                 Narrative:    Test Reason : R41.82,    Vent. Rate : 064 BPM     Atrial Rate : 064 BPM     P-R Int : 126 ms          QRS Dur : 080 ms      QT Int : 412 ms       P-R-T Axes : -03 075 059 degrees     QTc Int : 425 ms    Normal sinus rhythm  Low anterior forces  Otherwise normal ECG  When compared with ECG of 10-AUG-2020 01:29,  No change  Confirmed by Jonathan REICH MD ([id number]) on 5/25/2022 8:46:51 AM    Referred By: [id number]   SELF           Confirmed By:Jonathan REICH MD                            Imaging Results          CT Head Without Contrast (Final result)  Result time 05/25/22 06:14:20    Final result by Kaitlin Manrique MD (05/25/22 06:14:20)                 Impression:      1. No CT evidence of acute intracranial abnormality.  Clinical correlation and further evaluation as warranted.  Mild cerebellar volume loss.  2. Paranasal sinus disease as discussed above.  3. Nonspecific prominence of posterior nasopharyngeal soft tissues, likely relating to adenoidal hypertrophy in a patient of this chronologic age although correlation physical exam advised.    Electronically signed by resident: Jorje Wakefield  Date:    05/25/2022  Time:    05:14    Electronically signed by: Kaitlin Manrique MD  Date:    05/25/2022  Time:    06:14             Narrative:    EXAMINATION:  CT HEAD WITHOUT  CONTRAST    CLINICAL HISTORY:  Mental status change, unknown cause;    TECHNIQUE:  Low dose axial CT images obtained throughout the head without intravenous contrast. Sagittal and coronal reconstructions were performed.    COMPARISON:  None.    FINDINGS:  There is no acute intracranial hemorrhage, hydrocephalus, midline shift or mass effect.  There is mild cerebellar volume loss can be seen secondary to chronic medication use.  Gray-white matter differentiation is maintained.  The basal cisterns are patent.  The mastoid air cells are clear.  Mastoid air cells are essentially clear.  There is mucosal thickening and patchy opacification of the ethmoid air cells.  There is probable chronic small remote traumatic deformity of the right lamina left appropriate should.  There is mucosal thickening of the bilateral maxillary sinuses.  The calvarium is intact.  Nonspecific prominence of posterior nasopharyngeal soft tissues which could relate to adenoidal hypertrophy in a patient of this chronologic age although correlation physical exam advised.                               X-Ray Shoulder Trauma Left (Final result)  Result time 05/25/22 04:29:28    Final result by Josias Gant MD (05/25/22 04:29:28)                 Impression:      Probable Hill-Sachs deformity of the posterolateral humeral head and bony Bankart lesion, suggestive of prior shoulder dislocation.    Widening of the acromiohumeral interval which may suggest joint effusion or inferior subluxation of the glenohumeral joint.      Electronically signed by: Josias Gant MD  Date:    05/25/2022  Time:    04:29             Narrative:    EXAMINATION:  XR SHOULDER TRAUMA 3 VIEW LEFT    CLINICAL HISTORY:  Unspecified convulsions    TECHNIQUE:  Three views of the left shoulder were performed.    COMPARISON  None.    FINDINGS:  Exam quality is limited by suboptimal positioning.  There is a focal cortical defect involving the posterolateral aspect of the humeral  head likely related to Hill-Sachs defect.  Fragmentation of the inferior aspect of the glenoid which may represent Bankart lesion.  Otherwise, no acute displaced fracture.  No gross dislocation allowing for suboptimal positioning.  There is widening of the acromiohumeral interval which could be related to joint effusion or inferior subluxation of the glenohumeral joint.  No unexpected radiopaque foreign body.                              X-Rays:   Independently Interpreted Readings:   Other Readings:  XR Lt shoulder- no dislocation, ? Hill Sacks deformity    Medications   lorazepam injection 2 mg (2 mg Intramuscular Given 5/25/22 0429)   levETIRAcetam injection 2,000 mg (2,000 mg Intravenous Given 5/25/22 0601)   phenytoin (DILANTIN) ER capsule 200 mg (200 mg Oral Given 5/25/22 0601)        Medical Decision Making:   History:   I obtained history from: someone other than patient.       <> Summary of History: Mother- Jayashree [phone]  Attempted to call girlfriend but goes direct to voicemail. Per motherher phone is broken  Old Medical Records: I decided to obtain old medical records.  Old Records Summarized: records from another hospital.       <> Summary of Records: Recent admission on 5/16/22 for seizure, XR with possible Hill Sacks deformity, dilantin subtherapeutic  Initial Assessment:   32 y/o M with altered mental status- likely post ictal vs intoxication  Pt not cooperative and aggressive with staff requiring chemical sedation. PEC filed  Pt denies SI and HI, No A/VH  Pt informed PEC  Blood work sent as well as CThead  Independently Interpreted Test(s):   I have ordered and independently interpreted X-rays - see summary below.  I have ordered and independently interpreted EKG Reading(s) - see summary below  Clinical Tests:   Lab Tests: Ordered and Reviewed  Radiological Study: Ordered and Reviewed  Medical Tests: Ordered and Reviewed  ED Management:  7:00 AM  CT head unremarkable. No dislocation or acute  fracture on :t shoulder XR.   DOA presumptive positive for amphetamines and marijuana  Blood work unremarkable. dilantin levels subtherapeutic  Pt loaded with keppra and given dilantin.  Pt awake and alert. Oriented and cooperative. Pt informed that he is subtherapeutic in his seizure medications and this is likely why he has been having an increase in frequency of seizures- states he last saw a neurologist before he 'went to senior living'  Pt given new rx for keppra and dilantin (although I can see he was provided with rx from [facility] on 5/16)  Discussed with mother- she is coming to pick pt up. PEC rescinded and pt discharged home. Given referral for neurology                      Clinical Impression:   Final diagnoses:  [R41.82] Altered mental status  [R56.9] Seizure (Primary)  [Z91.19] Non-adherence to medical treatment          ED Disposition Condition    Discharge Stable        ED Prescriptions     Medication Sig Dispense Start Date End Date Auth. Provider    levETIRAcetam (KEPPRA) 500 MG Tab Take 1 tablet (500 mg total) by mouth 2 (two) times daily. 60 tablet 5/25/2022 5/25/2023 Ritu Medrano MD    phenytoin (DILANTIN) 100 MG ER capsule Take 2 capsules (200 mg total) by mouth once daily. 60 capsule 5/25/2022  Ritu Medrano MD    phenytoin (DILANTIN) 300 MG ER capsule Take 1 capsule (300 mg total) by mouth every evening. 20 capsule 5/25/2022  Ritu Medrano MD        Follow-up Information     Follow up With Specialties Details Why Contact [facility] - Neurology/Ms/Stroke Neurology Schedule an appointment as soon as possible for a visit   [address] [facility] [address]  [phone]             Ritu Medrano MD  05/25/22 8339

## 2022-05-25 NOTE — ED NOTES
"Cigarettes and lighter obtained from pt and in labeled belongings bag. Upon re entering room pt found with lit cigarette. Pt states " I hid one from you and I had another lighter." Lighter placed in belongings bag and cigarette extinguished. Pt instructed of danger of smoking in hospital room. Pt does not verbalize understanding and is agitated with staff. Charge RN notified of pts need for sitter.     Pt found strolling in hallway twice walking around exit signs. Pt escorted back to room.     Security notified and documented event of pt smoking in room.   "

## 2022-05-25 NOTE — ED NOTES
"Pt states " I'm ready to go outside." Explained to pt that pt is unable to exit at this time per MD instruction. Pt states " I'm leaving. Let security bring me back in then."   "

## 2022-05-25 NOTE — ED NOTES
"Assumed care of pt bed 09. Pt states " My wife called the ambulance and told them to bring me in. She said I wasn't making sense.  All that's wrong with me is my arm is hurting. It's my left arm. I had to have it put back into place after I caught a seizure a few days ago but they never put it back into place. All they did was get me back on my dilantin and keppra. When I had the seizure I hurt my arm and fell back into the wall and hit my head. It made a hole in the wall. " Pt reports being unable to perform ROM with LUE due to pain. Pt requesting to leave to smoke. Instructed pt to wait for MD to evaluate prior to leaving.   "

## 2022-05-25 NOTE — DISCHARGE INSTRUCTIONS
Take your seizure medication as prescribed by your doctor  Follow up with neurology- call today for an appointment  Return to ED for any concerns

## 2022-05-25 NOTE — ED NOTES
Patient returned from CT via stretcher with escort personnel without incident. Will continue to monitor and await results/orders/disposition. See event log/DC flowsheets for continued assessments/interventions. Bed in lowest position and locked. Side rails up x2. Call bell in reach of patient.

## 2022-05-25 NOTE — ED NOTES
"Pt requesting to leave and smoke cigarette. Instructed pt that smoking is prohibited on campus and IV must be removed for pt to exit ED. Pt states " I don't care I'm ready to go smoke. The IV isn't even in. " Requested pt refrain from leaving until seen by MD. Pt agrees at this time. IV not in place to R arm.   " Statement Selected

## 2022-05-25 NOTE — ED NOTES
Per MD PEC to be rescinded and pt to be discharged. Pt remains verbally abusive towards staff but has ceased threats and combativeness.

## 2022-05-25 NOTE — ED NOTES
Pt reports seizure tonight. Pt unsure of duration / onset. Per family a few minutes and pt followed with bizarre behavior. Mother reports wife called ambulance due to behavior post seixure. Pt also reports taking approx 4 ecstasy tablets. Pt reports headache. Pt denies nausea and vomiting. Pt denies photophobia/ fever/chills. Pt reports rash to penis. Pt reports hitting head and L arm. History of seizures. Pt denies htn/vision changes.

## 2022-05-25 NOTE — ED NOTES
Violent restraints placed to bilateral wrists and bilateral ankles at this time. Attempted to redirect pts physically and verbally aggressive / combative behavior without success. All CPI measures utilized and unsuccessful. Restraints applied as a last resort. Ray Charge RN notified / MD Mitchell notified. Security and RN as well as sitter at bedside to assist. Patient denies suicidal or homicidal ideation and denies auditory or visual hallucinations.   Pt combative towards staff and danger to self / others at this time due to altered mental status and bizarre behavior. PEC written / signed by MD Mitchell and given to unit secretary. Prior to restraint application pt changed into blue scrubs per PEC policy with all personal belongings removed from room and labeled. 1:1 direct observation sitter at bedside. Risk sitter does not have any personal belongings in the room, and is charged Q15 minutes per protocol.

## 2022-05-25 NOTE — ED NOTES
Pt screaming in room and becoming verbally aggressive towards sitter. Security called and en route to bedside.

## 2023-03-31 ENCOUNTER — HOSPITAL ENCOUNTER (EMERGENCY)
Facility: OTHER | Age: 33
Discharge: HOME | End: 2023-03-31
Attending: EMERGENCY MEDICINE
Payer: MEDICAID

## 2023-03-31 VITALS
WEIGHT: 226 LBS | BODY MASS INDEX: 29 KG/M2 | HEART RATE: 70 BPM | DIASTOLIC BLOOD PRESSURE: 94 MMHG | HEIGHT: 74 IN | RESPIRATION RATE: 17 BRPM | SYSTOLIC BLOOD PRESSURE: 141 MMHG | OXYGEN SATURATION: 99 % | TEMPERATURE: 98 F

## 2023-03-31 DIAGNOSIS — G40.909 SEIZURE DISORDER: Primary | ICD-10-CM

## 2023-03-31 LAB
ALBUMIN SERPL BCP-MCNC: 4 G/DL (ref 3.5–5.2)
ALP SERPL-CCNC: 52 U/L (ref 55–135)
ALT SERPL W/O P-5'-P-CCNC: 11 U/L (ref 10–44)
ANION GAP SERPL CALC-SCNC: 9 MMOL/L (ref 8–16)
AST SERPL-CCNC: 27 U/L (ref 10–40)
BASOPHILS # BLD AUTO: 0.02 K/UL (ref 0–0.2)
BASOPHILS NFR BLD: 0.2 % (ref 0–1.9)
BILIRUB SERPL-MCNC: 0.4 MG/DL (ref 0.1–1)
BUN SERPL-MCNC: 10 MG/DL (ref 6–20)
CALCIUM SERPL-MCNC: 9.1 MG/DL (ref 8.7–10.5)
CHLORIDE SERPL-SCNC: 110 MMOL/L (ref 95–110)
CO2 SERPL-SCNC: 20 MMOL/L (ref 23–29)
CREAT SERPL-MCNC: 1.3 MG/DL (ref 0.5–1.4)
DIFFERENTIAL METHOD: ABNORMAL
EOSINOPHIL # BLD AUTO: 0.1 K/UL (ref 0–0.5)
EOSINOPHIL NFR BLD: 0.7 % (ref 0–8)
ERYTHROCYTE [DISTWIDTH] IN BLOOD BY AUTOMATED COUNT: 13.5 % (ref 11.5–14.5)
EST. GFR  (NO RACE VARIABLE): >60 ML/MIN/1.73 M^2
GLUCOSE SERPL-MCNC: 78 MG/DL (ref 70–110)
HCT VFR BLD AUTO: 47.9 % (ref 40–54)
HCV AB SERPL QL IA: NEGATIVE
HGB BLD-MCNC: 16.1 G/DL (ref 14–18)
HIV 1+2 AB+HIV1 P24 AG SERPL QL IA: NORMAL
IMM GRANULOCYTES # BLD AUTO: 0.04 K/UL (ref 0–0.04)
IMM GRANULOCYTES NFR BLD AUTO: 0.5 % (ref 0–0.5)
LYMPHOCYTES # BLD AUTO: 0.9 K/UL (ref 1–4.8)
LYMPHOCYTES NFR BLD: 10.8 % (ref 18–48)
MCH RBC QN AUTO: 31.5 PG (ref 27–31)
MCHC RBC AUTO-ENTMCNC: 33.6 G/DL (ref 32–36)
MCV RBC AUTO: 94 FL (ref 82–98)
MONOCYTES # BLD AUTO: 0.4 K/UL (ref 0.3–1)
MONOCYTES NFR BLD: 5 % (ref 4–15)
NEUTROPHILS # BLD AUTO: 6.8 K/UL (ref 1.8–7.7)
NEUTROPHILS NFR BLD: 82.8 % (ref 38–73)
NRBC BLD-RTO: 0 /100 WBC
PHENYTOIN SERPL-MCNC: <0.1 UG/ML (ref 10–20)
PLATELET # BLD AUTO: 169 K/UL (ref 150–450)
PMV BLD AUTO: 10.8 FL (ref 9.2–12.9)
POCT GLUCOSE: 96 MG/DL (ref 70–110)
POTASSIUM SERPL-SCNC: 4.7 MMOL/L (ref 3.5–5.1)
PROT SERPL-MCNC: 7.6 G/DL (ref 6–8.4)
RBC # BLD AUTO: 5.11 M/UL (ref 4.6–6.2)
SODIUM SERPL-SCNC: 139 MMOL/L (ref 136–145)
WBC # BLD AUTO: 8.21 K/UL (ref 3.9–12.7)

## 2023-03-31 PROCEDURE — 87389 HIV-1 AG W/HIV-1&-2 AB AG IA: CPT | Performed by: EMERGENCY MEDICINE

## 2023-03-31 PROCEDURE — 96374 THER/PROPH/DIAG INJ IV PUSH: CPT

## 2023-03-31 PROCEDURE — 85025 COMPLETE CBC W/AUTO DIFF WBC: CPT | Performed by: EMERGENCY MEDICINE

## 2023-03-31 PROCEDURE — 80185 ASSAY OF PHENYTOIN TOTAL: CPT | Performed by: EMERGENCY MEDICINE

## 2023-03-31 PROCEDURE — 96361 HYDRATE IV INFUSION ADD-ON: CPT

## 2023-03-31 PROCEDURE — 80053 COMPREHEN METABOLIC PANEL: CPT | Performed by: EMERGENCY MEDICINE

## 2023-03-31 PROCEDURE — 99284 EMERGENCY DEPT VISIT MOD MDM: CPT | Mod: 25

## 2023-03-31 PROCEDURE — 82962 GLUCOSE BLOOD TEST: CPT

## 2023-03-31 PROCEDURE — 80177 DRUG SCRN QUAN LEVETIRACETAM: CPT | Performed by: EMERGENCY MEDICINE

## 2023-03-31 PROCEDURE — 86803 HEPATITIS C AB TEST: CPT | Performed by: EMERGENCY MEDICINE

## 2023-03-31 PROCEDURE — 25000003 PHARM REV CODE 250: Performed by: EMERGENCY MEDICINE

## 2023-03-31 PROCEDURE — 63600175 PHARM REV CODE 636 W HCPCS: Performed by: EMERGENCY MEDICINE

## 2023-03-31 RX ORDER — LEVETIRACETAM 500 MG/5ML
2000 INJECTION, SOLUTION, CONCENTRATE INTRAVENOUS
Status: COMPLETED | OUTPATIENT
Start: 2023-03-31 | End: 2023-03-31

## 2023-03-31 RX ORDER — LEVETIRACETAM 750 MG/1
750 TABLET ORAL 2 TIMES DAILY
Qty: 60 TABLET | Refills: 0 | Status: SHIPPED | OUTPATIENT
Start: 2023-03-31 | End: 2023-04-30

## 2023-03-31 RX ORDER — PHENYTOIN SODIUM 200 MG/1
200 CAPSULE, EXTENDED RELEASE ORAL 3 TIMES DAILY
Qty: 90 CAPSULE | Refills: 0 | Status: SHIPPED | OUTPATIENT
Start: 2023-03-31 | End: 2023-04-30

## 2023-03-31 RX ORDER — PHENYTOIN SODIUM 100 MG/1
400 CAPSULE, EXTENDED RELEASE ORAL
Status: COMPLETED | OUTPATIENT
Start: 2023-03-31 | End: 2023-03-31

## 2023-03-31 RX ADMIN — SODIUM CHLORIDE 1000 ML: 9 INJECTION, SOLUTION INTRAVENOUS at 07:03

## 2023-03-31 RX ADMIN — PHENYTOIN SODIUM 400 MG: 100 CAPSULE ORAL at 09:03

## 2023-03-31 RX ADMIN — LEVETIRACETAM 2000 MG: 500 INJECTION, SOLUTION INTRAVENOUS at 07:03

## 2023-03-31 NOTE — ED PROVIDER NOTES
Encounter Date: 3/31/2023    SCRIBE #1 NOTE: I, Shaq Herring, am scribing for, and in the presence of,  Carl Spann MD.     History     Chief Complaint   Patient presents with    Seizures     Pt arrives via EMS from home after 3 witnessed seizures while laying in bed. Hx of seizures, compliant with medications. Pt arrives A&O X 3     Time seen by provider: 7:10 AM    This is a 32 y.o. male who presents via EMS s/p multiple witnessed seizures earlier this morning at around. EMS reports that the patient's wife witnessed him have 3 seizures lasting around 1-2 minutes each while in bed this morning. The wife notes that the patient briefly awakened in between these seizures. The patient reports that he has been compliant with his prescription medications but his wife reports otherwise. Upon evaluation, the patient states that he takes 1 tablet of Keppra in the morning and 2 tablets of Dilantin at night. Per chart review, the patient has most recently been instructed to take 2 tablets of 200 mg Dilantin in the morning and at night and also 2 tablets of 750 mg Keppra in the morning and at night. This is the extent of the patient's complaints at this time.    The history is provided by the patient, medical records and the spouse.   Review of patient's allergies indicates:  No Known Allergies  Past Medical History:   Diagnosis Date    Anemia     Hypertension     Seizures      History reviewed. No pertinent surgical history.  History reviewed. No pertinent family history.  Social History     Tobacco Use    Smoking status: Every Day     Types: Cigarettes     Last attempt to quit: 8/10/2019     Years since quitting: 3.6    Smokeless tobacco: Never   Substance Use Topics    Alcohol use: Never    Drug use: Never     Review of Systems   Constitutional:  Negative for chills and fever.   HENT:  Negative for congestion and sore throat.    Eyes:  Negative for visual disturbance.   Respiratory:  Negative for cough and  shortness of breath.    Cardiovascular:  Negative for chest pain and palpitations.   Gastrointestinal:  Negative for abdominal pain, diarrhea and vomiting.   Genitourinary:  Negative for decreased urine volume, dysuria and frequency.   Musculoskeletal:  Negative for joint swelling, neck pain and neck stiffness.   Skin:  Negative for rash and wound.   Neurological:  Positive for seizures. Negative for weakness, numbness and headaches.   Psychiatric/Behavioral:  Negative for behavioral problems and confusion.      Physical Exam     Initial Vitals   BP Pulse Resp Temp SpO2   03/31/23 0650 03/31/23 0650 03/31/23 0650 03/31/23 0653 03/31/23 0650   132/87 81 18 97.8 °F (36.6 °C) 100 %      MAP       --                Physical Exam    Nursing note and vitals reviewed.  Constitutional: He appears well-developed and well-nourished. He is not diaphoretic. No distress.   HENT:   Head: Normocephalic and atraumatic.   Mouth/Throat: Oropharynx is clear and moist.   Eyes: Conjunctivae and EOM are normal. Pupils are equal, round, and reactive to light.   Conjunctivae pink, clear, and intact.    Neck: Neck supple.   No cervical lymphadenopathy.    Normal range of motion.  Cardiovascular:  Normal rate, regular rhythm and normal heart sounds.     Exam reveals no gallop and no friction rub.       No murmur heard.  Pulmonary/Chest: Breath sounds normal. No respiratory distress. He has no wheezes.   Abdominal: Abdomen is soft. Bowel sounds are normal. There is no abdominal tenderness.   Musculoskeletal:         General: No tenderness or edema. Normal range of motion.      Cervical back: Normal range of motion and neck supple.     Lymphadenopathy:     He has no cervical adenopathy.   Neurological: He is alert and oriented to person, place, and time.   Skin: Skin is warm and dry. Capillary refill takes less than 2 seconds. No rash noted. No pallor.   Psychiatric: He has a normal mood and affect. Thought content normal.       ED Course    Procedures  Labs Reviewed   CBC W/ AUTO DIFFERENTIAL - Abnormal; Notable for the following components:       Result Value    MCH 31.5 (*)     Lymph # 0.9 (*)     Gran % 82.8 (*)     Lymph % 10.8 (*)     All other components within normal limits   COMPREHENSIVE METABOLIC PANEL - Abnormal; Notable for the following components:    CO2 20 (*)     Alkaline Phosphatase 52 (*)     All other components within normal limits   PHENYTOIN LEVEL, TOTAL - Abnormal; Notable for the following components:    Phenytoin Lvl <0.1 (*)     All other components within normal limits   HEPATITIS C ANTIBODY    Narrative:     Release to patient->Immediate   LEVETIRACETAM  (KEPPRA) LEVEL   HIV 1 / 2 ANTIBODY   POCT GLUCOSE   POCT GLUCOSE MONITORING CONTINUOUS          Imaging Results    None          Medications   levETIRAcetam injection 2,000 mg (2,000 mg Intravenous Given 3/31/23 0727)   sodium chloride 0.9% bolus 1,000 mL 1,000 mL (0 mLs Intravenous Stopped 3/31/23 0820)   phenytoin (DILANTIN) ER capsule 400 mg (400 mg Oral Given 3/31/23 0914)     Medical Decision Making:   History:   Old Medical Records: I decided to obtain old medical records.  Clinical Tests:   Lab Tests: Ordered and Reviewed        Scribe Attestation:   Scribe #1: I performed the above scribed service and the documentation accurately describes the services I performed. I attest to the accuracy of the note.    Attending Attestation:             Attending ED Notes:   Emergent evaluation a 32-year-old male with past medical history of seizure disorder who presents to the ED with 3 seizures in bed without head trauma with being fully awake and coherent in between the 3 seizures.  Patient states he is compliant with his medications however per EMS patient's significant other states that patient is noncompliant with his seizure medications.  Patient has no elevation white blood cell count.  H&H within normal limits.  No acute findings on CMP.  Dilantin level is less than  0.1.  Patient is administered IV Keppra and p.o. Dilantin.  Patient is observed in the emergency room without any further seizure activity.  I evaluated patient's electronic medical record and in November patient was told to increase his Keppra to 1500 mg twice a day and Dilantin 400 mg twice a day.  Patient states he is only been taking his Keppra and Dilantin in the morning and not at the recommended dosing.  After full evaluation a patient's electronic medical record I will prescribe the patient Dilantin 200 mg 3 times a day and Keppra 750 mg twice a day.  I counseled the patient on his medication change and also counseled the patient that he will need close follow-up with his neurologist early next week for further evaluation of his medications and to check on his antiseizure medication levels.  The patient is extensively counseled on their diagnosis and treatment including all diagnostic, laboratory and physical exam findings.  The patient is discharged good condition and directed follow-up with their PCP and neurology in the next 24-48 hours.        Physician Attestation for Scribe: I, Carl Mathew, reviewed documentation as scribed in my presence, which is both accurate and complete.           Clinical Impression:   Final diagnoses:  [G40.909] Seizure disorder (Primary)        ED Disposition Condition    Discharge Good          ED Prescriptions       Medication Sig Dispense Start Date End Date Auth. Provider    phenytoin (DILANTIN) 200 MG ER capsule Take 1 capsule (200 mg total) by mouth 3 (three) times daily. 90 capsule 3/31/2023 4/30/2023 Carl Mathew MD    levETIRAcetam (KEPPRA) 750 MG Tab Take 1 tablet (750 mg total) by mouth 2 (two) times daily. 60 tablet 3/31/2023 4/30/2023 Carl Mathew MD          Follow-up Information    None          Carl Mathew MD  03/31/23 1029       Carl Mathew MD  03/31/23 1036

## 2023-04-03 LAB — LEVETIRACETAM SERPL-MCNC: <1 UG/ML (ref 3–60)

## 2023-10-23 NOTE — ED NOTES
Airway    Performed by: Hernesto Pemberton MD  Authorized by: Hernesto Pemberton MD    Final Airway Type:  Endotracheal airway  Final Endotracheal Airway*:  ETT  ETT Size (mm)*:  7.0  Cuff*:  Regular  Technique Used for Successful ETT Placement:  Direct laryngoscopy  Devices/Methods Used in Placement*:  Mask  Intubation Procedure*:  Preoxygenation, ETCO2, Atraumatic, Dentition Unchanged and Pharynx Clear  Insertion Site:  Oral  Blade Type*:  MAC  Blade Size*:  3  Placement Verified by: auscultation and capnometry    Glottic View*:  1 - full view of glottis  Attempts*:  1  Location:  OR  Urgency:  Elective  Difficult Airway: No    Indications for Airway Management:  Anesthesia  Mask Difficulty Assessment:  1 - vent by mask  Start Time: 10/23/2023 9:15 AM       Patient to CT via gurney with CT tech. Siderails up x2.